# Patient Record
Sex: MALE | Race: WHITE | NOT HISPANIC OR LATINO | Employment: OTHER | URBAN - METROPOLITAN AREA
[De-identification: names, ages, dates, MRNs, and addresses within clinical notes are randomized per-mention and may not be internally consistent; named-entity substitution may affect disease eponyms.]

---

## 2017-01-01 ENCOUNTER — TELEPHONE (OUTPATIENT)
Dept: CARDIOLOGY | Facility: CLINIC | Age: 82
End: 2017-01-01

## 2017-01-01 ENCOUNTER — ANESTHESIA (OUTPATIENT)
Dept: PERIOP | Facility: HOSPITAL | Age: 82
End: 2017-01-01

## 2017-01-01 ENCOUNTER — OFFICE VISIT (OUTPATIENT)
Dept: SURGERY | Facility: CLINIC | Age: 82
End: 2017-01-01

## 2017-01-01 ENCOUNTER — APPOINTMENT (OUTPATIENT)
Dept: CT IMAGING | Facility: HOSPITAL | Age: 82
End: 2017-01-01

## 2017-01-01 ENCOUNTER — ANESTHESIA EVENT (OUTPATIENT)
Dept: PERIOP | Facility: HOSPITAL | Age: 82
End: 2017-01-01

## 2017-01-01 ENCOUNTER — HOSPITAL ENCOUNTER (INPATIENT)
Facility: HOSPITAL | Age: 82
LOS: 3 days | End: 2017-03-13
Attending: EMERGENCY MEDICINE | Admitting: INTERNAL MEDICINE

## 2017-01-01 ENCOUNTER — TELEPHONE (OUTPATIENT)
Dept: ORTHOPEDIC SURGERY | Facility: CLINIC | Age: 82
End: 2017-01-01

## 2017-01-01 ENCOUNTER — APPOINTMENT (OUTPATIENT)
Dept: GENERAL RADIOLOGY | Facility: HOSPITAL | Age: 82
End: 2017-01-01

## 2017-01-01 VITALS
SYSTOLIC BLOOD PRESSURE: 122 MMHG | HEART RATE: 61 BPM | BODY MASS INDEX: 28.43 KG/M2 | RESPIRATION RATE: 20 BRPM | WEIGHT: 198.6 LBS | TEMPERATURE: 97.8 F | HEIGHT: 70 IN | DIASTOLIC BLOOD PRESSURE: 78 MMHG | OXYGEN SATURATION: 97 %

## 2017-01-01 VITALS
BODY MASS INDEX: 27.84 KG/M2 | WEIGHT: 194.44 LBS | HEIGHT: 70 IN | DIASTOLIC BLOOD PRESSURE: 64 MMHG | SYSTOLIC BLOOD PRESSURE: 110 MMHG | OXYGEN SATURATION: 91 % | TEMPERATURE: 102.7 F

## 2017-01-01 DIAGNOSIS — G30.8 ALZHEIMER'S DISEASE OF OTHER ONSET WITHOUT BEHAVIORAL DISTURBANCE: ICD-10-CM

## 2017-01-01 DIAGNOSIS — R10.9 ABDOMINAL PAIN, UNSPECIFIED LOCATION: Primary | ICD-10-CM

## 2017-01-01 DIAGNOSIS — K57.92 DIVERTICULITIS OF INTESTINE WITHOUT PERFORATION OR ABSCESS WITHOUT BLEEDING, UNSPECIFIED PART OF INTESTINAL TRACT: ICD-10-CM

## 2017-01-01 DIAGNOSIS — K64.4 EXTERNAL HEMORRHOIDS WITHOUT COMPLICATION: ICD-10-CM

## 2017-01-01 DIAGNOSIS — K64.8 INTERNAL HEMORRHOIDS WITHOUT COMPLICATION: Primary | ICD-10-CM

## 2017-01-01 DIAGNOSIS — I10 ESSENTIAL HYPERTENSION: ICD-10-CM

## 2017-01-01 DIAGNOSIS — F02.80 ALZHEIMER'S DISEASE OF OTHER ONSET WITHOUT BEHAVIORAL DISTURBANCE: ICD-10-CM

## 2017-01-01 LAB
ALBUMIN SERPL-MCNC: 4.4 G/DL (ref 3.5–5.2)
ALBUMIN/GLOB SERPL: 1.4 G/DL
ALP SERPL-CCNC: 87 U/L (ref 39–117)
ALT SERPL W P-5'-P-CCNC: 33 U/L (ref 1–41)
ANION GAP SERPL CALCULATED.3IONS-SCNC: 14.9 MMOL/L
AST SERPL-CCNC: 55 U/L (ref 1–40)
BACTERIA SPEC AEROBE CULT: NO GROWTH
BACTERIA UR QL AUTO: ABNORMAL /HPF
BASOPHILS # BLD AUTO: 0.01 10*3/MM3 (ref 0–0.2)
BASOPHILS # BLD AUTO: 0.03 10*3/MM3 (ref 0–0.2)
BASOPHILS NFR BLD AUTO: 0.1 % (ref 0–1.5)
BASOPHILS NFR BLD AUTO: 0.4 % (ref 0–1.5)
BILIRUB SERPL-MCNC: 0.6 MG/DL (ref 0.1–1.2)
BILIRUB UR QL STRIP: NEGATIVE
BUN BLD-MCNC: 18 MG/DL (ref 8–23)
BUN/CREAT SERPL: 15.3 (ref 7–25)
CALCIUM SPEC-SCNC: 10 MG/DL (ref 8.6–10.5)
CHLORIDE SERPL-SCNC: 103 MMOL/L (ref 98–107)
CLARITY UR: ABNORMAL
CO2 SERPL-SCNC: 24.1 MMOL/L (ref 22–29)
COLOR UR: ABNORMAL
CREAT BLD-MCNC: 1.18 MG/DL (ref 0.76–1.27)
D-LACTATE SERPL-SCNC: 1.7 MMOL/L (ref 0.5–2)
D-LACTATE SERPL-SCNC: 4.4 MMOL/L (ref 0.5–2)
DEPRECATED RDW RBC AUTO: 52.4 FL (ref 37–54)
DEPRECATED RDW RBC AUTO: 54.6 FL (ref 37–54)
EOSINOPHIL # BLD AUTO: 0 10*3/MM3 (ref 0–0.7)
EOSINOPHIL # BLD AUTO: 0.15 10*3/MM3 (ref 0–0.7)
EOSINOPHIL NFR BLD AUTO: 0 % (ref 0.3–6.2)
EOSINOPHIL NFR BLD AUTO: 2.2 % (ref 0.3–6.2)
ERYTHROCYTE [DISTWIDTH] IN BLOOD BY AUTOMATED COUNT: 14.8 % (ref 11.5–14.5)
ERYTHROCYTE [DISTWIDTH] IN BLOOD BY AUTOMATED COUNT: 14.9 % (ref 11.5–14.5)
GFR SERPL CREATININE-BSD FRML MDRD: 59 ML/MIN/1.73
GLOBULIN UR ELPH-MCNC: 3.2 GM/DL
GLUCOSE BLD-MCNC: 150 MG/DL (ref 65–99)
GLUCOSE UR STRIP-MCNC: NEGATIVE MG/DL
GRAN CASTS URNS QL MICRO: ABNORMAL /LPF
HCT VFR BLD AUTO: 44 % (ref 40.4–52.2)
HCT VFR BLD AUTO: 47.3 % (ref 40.4–52.2)
HGB BLD-MCNC: 14.2 G/DL (ref 13.7–17.6)
HGB BLD-MCNC: 14.9 G/DL (ref 13.7–17.6)
HGB UR QL STRIP.AUTO: NEGATIVE
HYALINE CASTS UR QL AUTO: ABNORMAL /LPF
IMM GRANULOCYTES # BLD: 0 10*3/MM3 (ref 0–0.03)
IMM GRANULOCYTES # BLD: 0.03 10*3/MM3 (ref 0–0.03)
IMM GRANULOCYTES NFR BLD: 0 % (ref 0–0.5)
IMM GRANULOCYTES NFR BLD: 0.2 % (ref 0–0.5)
KETONES UR QL STRIP: NEGATIVE
LEUKOCYTE ESTERASE UR QL STRIP.AUTO: NEGATIVE
LIPASE SERPL-CCNC: 45 U/L (ref 13–60)
LYMPHOCYTES # BLD AUTO: 0.55 10*3/MM3 (ref 0.9–4.8)
LYMPHOCYTES # BLD AUTO: 1.15 10*3/MM3 (ref 0.9–4.8)
LYMPHOCYTES NFR BLD AUTO: 16.7 % (ref 19.6–45.3)
LYMPHOCYTES NFR BLD AUTO: 4.3 % (ref 19.6–45.3)
MCH RBC QN AUTO: 31.4 PG (ref 27–32.7)
MCH RBC QN AUTO: 31.5 PG (ref 27–32.7)
MCHC RBC AUTO-ENTMCNC: 31.5 G/DL (ref 32.6–36.4)
MCHC RBC AUTO-ENTMCNC: 32.3 G/DL (ref 32.6–36.4)
MCV RBC AUTO: 97.6 FL (ref 79.8–96.2)
MCV RBC AUTO: 99.6 FL (ref 79.8–96.2)
MONOCYTES # BLD AUTO: 0.5 10*3/MM3 (ref 0.2–1.2)
MONOCYTES # BLD AUTO: 0.52 10*3/MM3 (ref 0.2–1.2)
MONOCYTES NFR BLD AUTO: 4.1 % (ref 5–12)
MONOCYTES NFR BLD AUTO: 7.3 % (ref 5–12)
NEUTROPHILS # BLD AUTO: 11.63 10*3/MM3 (ref 1.9–8.1)
NEUTROPHILS # BLD AUTO: 5.06 10*3/MM3 (ref 1.9–8.1)
NEUTROPHILS NFR BLD AUTO: 73.4 % (ref 42.7–76)
NEUTROPHILS NFR BLD AUTO: 91.3 % (ref 42.7–76)
NITRITE UR QL STRIP: NEGATIVE
PH UR STRIP.AUTO: <=5 [PH] (ref 5–8)
PLATELET # BLD AUTO: 207 10*3/MM3 (ref 140–500)
PLATELET # BLD AUTO: 227 10*3/MM3 (ref 140–500)
PMV BLD AUTO: 10.2 FL (ref 6–12)
PMV BLD AUTO: 10.5 FL (ref 6–12)
POTASSIUM BLD-SCNC: 3.9 MMOL/L (ref 3.5–5.2)
PROT SERPL-MCNC: 7.6 G/DL (ref 6–8.5)
PROT UR QL STRIP: ABNORMAL
RBC # BLD AUTO: 4.51 10*6/MM3 (ref 4.6–6)
RBC # BLD AUTO: 4.75 10*6/MM3 (ref 4.6–6)
RBC # UR: ABNORMAL /HPF
REF LAB TEST METHOD: ABNORMAL
SODIUM BLD-SCNC: 142 MMOL/L (ref 136–145)
SP GR UR STRIP: 1.02 (ref 1–1.03)
SQUAMOUS #/AREA URNS HPF: ABNORMAL /HPF
TROPONIN T SERPL-MCNC: <0.01 NG/ML (ref 0–0.03)
UROBILINOGEN UR QL STRIP: ABNORMAL
WBC NRBC COR # BLD: 12.74 10*3/MM3 (ref 4.5–10.7)
WBC NRBC COR # BLD: 6.89 10*3/MM3 (ref 4.5–10.7)
WBC UR QL AUTO: ABNORMAL /HPF

## 2017-01-01 PROCEDURE — 81001 URINALYSIS AUTO W/SCOPE: CPT | Performed by: EMERGENCY MEDICINE

## 2017-01-01 PROCEDURE — 0WJG0ZZ INSPECTION OF PERITONEAL CAVITY, OPEN APPROACH: ICD-10-PCS | Performed by: SURGERY

## 2017-01-01 PROCEDURE — 49000 EXPLORATION OF ABDOMEN: CPT | Performed by: SURGERY

## 2017-01-01 PROCEDURE — 99204 OFFICE O/P NEW MOD 45 MIN: CPT | Performed by: COLON & RECTAL SURGERY

## 2017-01-01 PROCEDURE — 25010000002 NEOSTIGMINE 10 MG/10ML SOLUTION: Performed by: ANESTHESIOLOGY

## 2017-01-01 PROCEDURE — 99223 1ST HOSP IP/OBS HIGH 75: CPT | Performed by: SURGERY

## 2017-01-01 PROCEDURE — 74176 CT ABD & PELVIS W/O CONTRAST: CPT

## 2017-01-01 PROCEDURE — 87086 URINE CULTURE/COLONY COUNT: CPT | Performed by: EMERGENCY MEDICINE

## 2017-01-01 PROCEDURE — 25010000002 HYDROMORPHONE PER 4 MG

## 2017-01-01 PROCEDURE — 94799 UNLISTED PULMONARY SVC/PX: CPT

## 2017-01-01 PROCEDURE — 25010000002 LORAZEPAM PER 2 MG: Performed by: INTERNAL MEDICINE

## 2017-01-01 PROCEDURE — 25010000002 HYDRALAZINE PER 20 MG: Performed by: EMERGENCY MEDICINE

## 2017-01-01 PROCEDURE — 25010000002 PIPERACILLIN SOD-TAZOBACTAM PER 1 G: Performed by: SURGERY

## 2017-01-01 PROCEDURE — 25010000002 MORPHINE PER 10 MG: Performed by: EMERGENCY MEDICINE

## 2017-01-01 PROCEDURE — 25010000002 ONDANSETRON PER 1 MG: Performed by: EMERGENCY MEDICINE

## 2017-01-01 PROCEDURE — 25010000002 LORAZEPAM PER 2 MG: Performed by: SURGERY

## 2017-01-01 PROCEDURE — 93005 ELECTROCARDIOGRAM TRACING: CPT | Performed by: INTERNAL MEDICINE

## 2017-01-01 PROCEDURE — 94002 VENT MGMT INPAT INIT DAY: CPT

## 2017-01-01 PROCEDURE — 25010000002 FENTANYL CITRATE (PF) 100 MCG/2ML SOLUTION: Performed by: ANESTHESIOLOGY

## 2017-01-01 PROCEDURE — 85025 COMPLETE CBC W/AUTO DIFF WBC: CPT | Performed by: EMERGENCY MEDICINE

## 2017-01-01 PROCEDURE — 46600 DIAGNOSTIC ANOSCOPY SPX: CPT | Performed by: COLON & RECTAL SURGERY

## 2017-01-01 PROCEDURE — 0WJP4ZZ INSPECTION OF GASTROINTESTINAL TRACT, PERCUTANEOUS ENDOSCOPIC APPROACH: ICD-10-PCS | Performed by: SURGERY

## 2017-01-01 PROCEDURE — 93005 ELECTROCARDIOGRAM TRACING: CPT | Performed by: PHYSICIAN ASSISTANT

## 2017-01-01 PROCEDURE — 25010000002 DEXAMETHASONE PER 1 MG: Performed by: ANESTHESIOLOGY

## 2017-01-01 PROCEDURE — 93010 ELECTROCARDIOGRAM REPORT: CPT | Performed by: INTERNAL MEDICINE

## 2017-01-01 PROCEDURE — 25010000002 HYDROMORPHONE PER 4 MG: Performed by: INTERNAL MEDICINE

## 2017-01-01 PROCEDURE — 25010000002 HYDROMORPHONE PER 4 MG: Performed by: EMERGENCY MEDICINE

## 2017-01-01 PROCEDURE — 25010000002 SUCCINYLCHOLINE PER 20 MG: Performed by: ANESTHESIOLOGY

## 2017-01-01 PROCEDURE — 99285 EMERGENCY DEPT VISIT HI MDM: CPT

## 2017-01-01 PROCEDURE — 25010000002 MORPHINE PER 10 MG: Performed by: INTERNAL MEDICINE

## 2017-01-01 PROCEDURE — 83690 ASSAY OF LIPASE: CPT | Performed by: EMERGENCY MEDICINE

## 2017-01-01 PROCEDURE — 25010000002 ONDANSETRON PER 1 MG: Performed by: ANESTHESIOLOGY

## 2017-01-01 PROCEDURE — 71010 HC CHEST PA OR AP: CPT

## 2017-01-01 PROCEDURE — 25010000002 HYDROMORPHONE PER 4 MG: Performed by: ANESTHESIOLOGY

## 2017-01-01 PROCEDURE — 0WJP0ZZ INSPECTION OF GASTROINTESTINAL TRACT, OPEN APPROACH: ICD-10-PCS | Performed by: SURGERY

## 2017-01-01 PROCEDURE — 83605 ASSAY OF LACTIC ACID: CPT | Performed by: INTERNAL MEDICINE

## 2017-01-01 PROCEDURE — 83605 ASSAY OF LACTIC ACID: CPT | Performed by: EMERGENCY MEDICINE

## 2017-01-01 PROCEDURE — 84484 ASSAY OF TROPONIN QUANT: CPT | Performed by: PHYSICIAN ASSISTANT

## 2017-01-01 PROCEDURE — 0DJW4ZZ INSPECTION OF PERITONEUM, PERCUTANEOUS ENDOSCOPIC APPROACH: ICD-10-PCS | Performed by: SURGERY

## 2017-01-01 PROCEDURE — 25010000002 LEVOFLOXACIN PER 250 MG: Performed by: INTERNAL MEDICINE

## 2017-01-01 PROCEDURE — 80053 COMPREHEN METABOLIC PANEL: CPT | Performed by: EMERGENCY MEDICINE

## 2017-01-01 PROCEDURE — 25010000002 MORPHINE PER 10 MG: Performed by: PHYSICIAN ASSISTANT

## 2017-01-01 PROCEDURE — 25010000002 ONDANSETRON PER 1 MG: Performed by: INTERNAL MEDICINE

## 2017-01-01 PROCEDURE — 99221 1ST HOSP IP/OBS SF/LOW 40: CPT | Performed by: INTERNAL MEDICINE

## 2017-01-01 PROCEDURE — 25010000002 PHENYLEPHRINE PER 1 ML: Performed by: ANESTHESIOLOGY

## 2017-01-01 PROCEDURE — 25010000002 LORAZEPAM PER 2 MG: Performed by: EMERGENCY MEDICINE

## 2017-01-01 PROCEDURE — 85025 COMPLETE CBC W/AUTO DIFF WBC: CPT | Performed by: INTERNAL MEDICINE

## 2017-01-01 PROCEDURE — 25010000002 PROPOFOL 10 MG/ML EMULSION: Performed by: ANESTHESIOLOGY

## 2017-01-01 RX ORDER — MORPHINE SULFATE 2 MG/ML
2 INJECTION, SOLUTION INTRAMUSCULAR; INTRAVENOUS ONCE
Status: COMPLETED | OUTPATIENT
Start: 2017-01-01 | End: 2017-01-01

## 2017-01-01 RX ORDER — LORAZEPAM 2 MG/ML
1 INJECTION INTRAMUSCULAR
Status: DISCONTINUED | OUTPATIENT
Start: 2017-01-01 | End: 2017-01-01 | Stop reason: HOSPADM

## 2017-01-01 RX ORDER — LABETALOL HYDROCHLORIDE 5 MG/ML
10 INJECTION, SOLUTION INTRAVENOUS ONCE
Status: COMPLETED | OUTPATIENT
Start: 2017-01-01 | End: 2017-01-01

## 2017-01-01 RX ORDER — HYDROMORPHONE HCL 110MG/55ML
2 PATIENT CONTROLLED ANALGESIA SYRINGE INTRAVENOUS
Status: DISCONTINUED | OUTPATIENT
Start: 2017-01-01 | End: 2017-01-01 | Stop reason: HOSPADM

## 2017-01-01 RX ORDER — ACETAMINOPHEN 325 MG/1
650 TABLET ORAL EVERY 4 HOURS PRN
Status: DISCONTINUED | OUTPATIENT
Start: 2017-01-01 | End: 2017-01-01 | Stop reason: HOSPADM

## 2017-01-01 RX ORDER — PANTOPRAZOLE SODIUM 40 MG/10ML
40 INJECTION, POWDER, LYOPHILIZED, FOR SOLUTION INTRAVENOUS
Status: DISCONTINUED | OUTPATIENT
Start: 2017-01-01 | End: 2017-01-01 | Stop reason: HOSPADM

## 2017-01-01 RX ORDER — PROMETHAZINE HYDROCHLORIDE 25 MG/1
12.5 TABLET ORAL ONCE AS NEEDED
Status: DISCONTINUED | OUTPATIENT
Start: 2017-01-01 | End: 2017-01-01 | Stop reason: HOSPADM

## 2017-01-01 RX ORDER — TAMSULOSIN HYDROCHLORIDE 0.4 MG/1
0.4 CAPSULE ORAL DAILY
Status: DISCONTINUED | OUTPATIENT
Start: 2017-01-01 | End: 2017-01-01

## 2017-01-01 RX ORDER — FENTANYL CITRATE 50 UG/ML
INJECTION, SOLUTION INTRAMUSCULAR; INTRAVENOUS AS NEEDED
Status: DISCONTINUED | OUTPATIENT
Start: 2017-01-01 | End: 2017-01-01 | Stop reason: SURG

## 2017-01-01 RX ORDER — IBUPROFEN 200 MG
625 CAPSULE ORAL 2 TIMES DAILY WITH MEALS
Status: DISCONTINUED | OUTPATIENT
Start: 2017-01-01 | End: 2017-01-01

## 2017-01-01 RX ORDER — HYDROMORPHONE HYDROCHLORIDE 1 MG/ML
0.25 INJECTION, SOLUTION INTRAMUSCULAR; INTRAVENOUS; SUBCUTANEOUS
Status: DISCONTINUED | OUTPATIENT
Start: 2017-01-01 | End: 2017-01-01 | Stop reason: HOSPADM

## 2017-01-01 RX ORDER — LORAZEPAM 2 MG/ML
2 CONCENTRATE ORAL
Status: DISCONTINUED | OUTPATIENT
Start: 2017-01-01 | End: 2017-01-01 | Stop reason: HOSPADM

## 2017-01-01 RX ORDER — SODIUM CHLORIDE 0.9 % (FLUSH) 0.9 %
1-10 SYRINGE (ML) INJECTION AS NEEDED
Status: DISCONTINUED | OUTPATIENT
Start: 2017-01-01 | End: 2017-01-01 | Stop reason: HOSPADM

## 2017-01-01 RX ORDER — SCOLOPAMINE TRANSDERMAL SYSTEM 1 MG/1
1 PATCH, EXTENDED RELEASE TRANSDERMAL
Status: DISCONTINUED | OUTPATIENT
Start: 2017-01-01 | End: 2017-01-01 | Stop reason: HOSPADM

## 2017-01-01 RX ORDER — SODIUM CHLORIDE 0.9 % (FLUSH) 0.9 %
10 SYRINGE (ML) INJECTION AS NEEDED
Status: DISCONTINUED | OUTPATIENT
Start: 2017-01-01 | End: 2017-01-01

## 2017-01-01 RX ORDER — LABETALOL HYDROCHLORIDE 5 MG/ML
5 INJECTION, SOLUTION INTRAVENOUS
Status: DISCONTINUED | OUTPATIENT
Start: 2017-01-01 | End: 2017-01-01 | Stop reason: HOSPADM

## 2017-01-01 RX ORDER — LORAZEPAM 2 MG/ML
0.5 CONCENTRATE ORAL
Status: DISCONTINUED | OUTPATIENT
Start: 2017-01-01 | End: 2017-01-01 | Stop reason: HOSPADM

## 2017-01-01 RX ORDER — DIPHENHYDRAMINE HYDROCHLORIDE 50 MG/ML
6.25 INJECTION INTRAMUSCULAR; INTRAVENOUS
Status: DISCONTINUED | OUTPATIENT
Start: 2017-01-01 | End: 2017-01-01 | Stop reason: HOSPADM

## 2017-01-01 RX ORDER — NEOSTIGMINE METHYLSULFATE 1 MG/ML
INJECTION, SOLUTION INTRAVENOUS AS NEEDED
Status: DISCONTINUED | OUTPATIENT
Start: 2017-01-01 | End: 2017-01-01 | Stop reason: SURG

## 2017-01-01 RX ORDER — LORAZEPAM 2 MG/ML
0.5 INJECTION INTRAMUSCULAR ONCE
Status: COMPLETED | OUTPATIENT
Start: 2017-01-01 | End: 2017-01-01

## 2017-01-01 RX ORDER — ROCURONIUM BROMIDE 10 MG/ML
INJECTION, SOLUTION INTRAVENOUS AS NEEDED
Status: DISCONTINUED | OUTPATIENT
Start: 2017-01-01 | End: 2017-01-01 | Stop reason: SURG

## 2017-01-01 RX ORDER — ONDANSETRON 2 MG/ML
4 INJECTION INTRAMUSCULAR; INTRAVENOUS ONCE AS NEEDED
Status: DISCONTINUED | OUTPATIENT
Start: 2017-01-01 | End: 2017-01-01 | Stop reason: HOSPADM

## 2017-01-01 RX ORDER — METOPROLOL SUCCINATE 25 MG/1
25 TABLET, EXTENDED RELEASE ORAL DAILY
COMMUNITY

## 2017-01-01 RX ORDER — ONDANSETRON 2 MG/ML
INJECTION INTRAMUSCULAR; INTRAVENOUS AS NEEDED
Status: DISCONTINUED | OUTPATIENT
Start: 2017-01-01 | End: 2017-01-01 | Stop reason: SURG

## 2017-01-01 RX ORDER — PROPOFOL 10 MG/ML
VIAL (ML) INTRAVENOUS AS NEEDED
Status: DISCONTINUED | OUTPATIENT
Start: 2017-01-01 | End: 2017-01-01 | Stop reason: SURG

## 2017-01-01 RX ORDER — FUROSEMIDE 20 MG/1
20 TABLET ORAL DAILY
Status: DISCONTINUED | OUTPATIENT
Start: 2017-01-01 | End: 2017-01-01

## 2017-01-01 RX ORDER — ACETAMINOPHEN 160 MG/5ML
650 SOLUTION ORAL EVERY 4 HOURS PRN
Status: DISCONTINUED | OUTPATIENT
Start: 2017-01-01 | End: 2017-01-01 | Stop reason: HOSPADM

## 2017-01-01 RX ORDER — LORAZEPAM 1 MG/1
1 TABLET ORAL
Status: DISCONTINUED | OUTPATIENT
Start: 2017-01-01 | End: 2017-01-01 | Stop reason: HOSPADM

## 2017-01-01 RX ORDER — DIPHENOXYLATE HYDROCHLORIDE AND ATROPINE SULFATE 2.5; .025 MG/1; MG/1
1 TABLET ORAL
Status: DISCONTINUED | OUTPATIENT
Start: 2017-01-01 | End: 2017-01-01 | Stop reason: HOSPADM

## 2017-01-01 RX ORDER — LORAZEPAM 2 MG/ML
1 INJECTION INTRAMUSCULAR EVERY 4 HOURS PRN
Status: DISCONTINUED | OUTPATIENT
Start: 2017-01-01 | End: 2017-01-01

## 2017-01-01 RX ORDER — NALOXONE HCL 0.4 MG/ML
0.4 VIAL (ML) INJECTION AS NEEDED
Status: DISCONTINUED | OUTPATIENT
Start: 2017-01-01 | End: 2017-01-01 | Stop reason: HOSPADM

## 2017-01-01 RX ORDER — LORAZEPAM 2 MG/ML
1 CONCENTRATE ORAL
Status: DISCONTINUED | OUTPATIENT
Start: 2017-01-01 | End: 2017-01-01 | Stop reason: HOSPADM

## 2017-01-01 RX ORDER — SODIUM CHLORIDE 9 MG/ML
INJECTION, SOLUTION INTRAVENOUS AS NEEDED
Status: DISCONTINUED | OUTPATIENT
Start: 2017-01-01 | End: 2017-01-01 | Stop reason: HOSPADM

## 2017-01-01 RX ORDER — GLYCOPYRROLATE 0.2 MG/ML
INJECTION INTRAMUSCULAR; INTRAVENOUS AS NEEDED
Status: DISCONTINUED | OUTPATIENT
Start: 2017-01-01 | End: 2017-01-01 | Stop reason: SURG

## 2017-01-01 RX ORDER — HYDROMORPHONE HYDROCHLORIDE 1 MG/ML
0.5 INJECTION, SOLUTION INTRAMUSCULAR; INTRAVENOUS; SUBCUTANEOUS
Status: DISCONTINUED | OUTPATIENT
Start: 2017-01-01 | End: 2017-01-01 | Stop reason: HOSPADM

## 2017-01-01 RX ORDER — DEXAMETHASONE SODIUM PHOSPHATE 10 MG/ML
INJECTION INTRAMUSCULAR; INTRAVENOUS AS NEEDED
Status: DISCONTINUED | OUTPATIENT
Start: 2017-01-01 | End: 2017-01-01 | Stop reason: SURG

## 2017-01-01 RX ORDER — QUETIAPINE FUMARATE 50 MG/1
50 TABLET, FILM COATED ORAL NIGHTLY
Status: DISCONTINUED | OUTPATIENT
Start: 2017-01-01 | End: 2017-01-01

## 2017-01-01 RX ORDER — FENTANYL CITRATE 50 UG/ML
50 INJECTION, SOLUTION INTRAMUSCULAR; INTRAVENOUS
Status: DISCONTINUED | OUTPATIENT
Start: 2017-01-01 | End: 2017-01-01 | Stop reason: HOSPADM

## 2017-01-01 RX ORDER — MAGNESIUM HYDROXIDE 1200 MG/15ML
LIQUID ORAL AS NEEDED
Status: DISCONTINUED | OUTPATIENT
Start: 2017-01-01 | End: 2017-01-01 | Stop reason: HOSPADM

## 2017-01-01 RX ORDER — LIDOCAINE HYDROCHLORIDE 20 MG/ML
INJECTION, SOLUTION INFILTRATION; PERINEURAL AS NEEDED
Status: DISCONTINUED | OUTPATIENT
Start: 2017-01-01 | End: 2017-01-01 | Stop reason: SURG

## 2017-01-01 RX ORDER — HYDRALAZINE HYDROCHLORIDE 20 MG/ML
5 INJECTION INTRAMUSCULAR; INTRAVENOUS
Status: DISCONTINUED | OUTPATIENT
Start: 2017-01-01 | End: 2017-01-01 | Stop reason: HOSPADM

## 2017-01-01 RX ORDER — ONDANSETRON 2 MG/ML
4 INJECTION INTRAMUSCULAR; INTRAVENOUS ONCE
Status: COMPLETED | OUTPATIENT
Start: 2017-01-01 | End: 2017-01-01

## 2017-01-01 RX ORDER — PROMETHAZINE HYDROCHLORIDE 25 MG/1
25 SUPPOSITORY RECTAL ONCE AS NEEDED
Status: DISCONTINUED | OUTPATIENT
Start: 2017-01-01 | End: 2017-01-01 | Stop reason: HOSPADM

## 2017-01-01 RX ORDER — LORAZEPAM 2 MG/ML
0.5 INJECTION INTRAMUSCULAR
Status: DISCONTINUED | OUTPATIENT
Start: 2017-01-01 | End: 2017-01-01 | Stop reason: HOSPADM

## 2017-01-01 RX ORDER — HYDROMORPHONE HCL 110MG/55ML
1.5 PATIENT CONTROLLED ANALGESIA SYRINGE INTRAVENOUS
Status: DISCONTINUED | OUTPATIENT
Start: 2017-01-01 | End: 2017-01-01 | Stop reason: HOSPADM

## 2017-01-01 RX ORDER — FAMOTIDINE 10 MG/ML
20 INJECTION, SOLUTION INTRAVENOUS ONCE
Status: COMPLETED | OUTPATIENT
Start: 2017-01-01 | End: 2017-01-01

## 2017-01-01 RX ORDER — LIDOCAINE HYDROCHLORIDE 10 MG/ML
0.5 INJECTION, SOLUTION EPIDURAL; INFILTRATION; INTRACAUDAL; PERINEURAL ONCE AS NEEDED
Status: DISCONTINUED | OUTPATIENT
Start: 2017-01-01 | End: 2017-01-01 | Stop reason: HOSPADM

## 2017-01-01 RX ORDER — PROMETHAZINE HYDROCHLORIDE 25 MG/ML
6.25 INJECTION, SOLUTION INTRAMUSCULAR; INTRAVENOUS ONCE AS NEEDED
Status: DISCONTINUED | OUTPATIENT
Start: 2017-01-01 | End: 2017-01-01 | Stop reason: HOSPADM

## 2017-01-01 RX ORDER — LEVOTHYROXINE SODIUM ANHYDROUS 100 UG/5ML
125 INJECTION, POWDER, LYOPHILIZED, FOR SOLUTION INTRAVENOUS
Status: DISCONTINUED | OUTPATIENT
Start: 2017-01-01 | End: 2017-01-01 | Stop reason: HOSPADM

## 2017-01-01 RX ORDER — ACETAMINOPHEN 650 MG/1
650 SUPPOSITORY RECTAL EVERY 4 HOURS PRN
Status: DISCONTINUED | OUTPATIENT
Start: 2017-01-01 | End: 2017-01-01 | Stop reason: HOSPADM

## 2017-01-01 RX ORDER — MODAFINIL 100 MG/1
200 TABLET ORAL DAILY
Status: DISCONTINUED | OUTPATIENT
Start: 2017-01-01 | End: 2017-01-01

## 2017-01-01 RX ORDER — LORAZEPAM 2 MG/ML
1 INJECTION INTRAMUSCULAR ONCE
Status: COMPLETED | OUTPATIENT
Start: 2017-01-01 | End: 2017-01-01

## 2017-01-01 RX ORDER — GLYCOPYRROLATE 0.2 MG/ML
0.2 INJECTION INTRAMUSCULAR; INTRAVENOUS
Status: DISCONTINUED | OUTPATIENT
Start: 2017-01-01 | End: 2017-01-01 | Stop reason: HOSPADM

## 2017-01-01 RX ORDER — NALOXONE HCL 0.4 MG/ML
0.4 VIAL (ML) INJECTION
Status: DISCONTINUED | OUTPATIENT
Start: 2017-01-01 | End: 2017-01-01 | Stop reason: HOSPADM

## 2017-01-01 RX ORDER — HYDROMORPHONE HYDROCHLORIDE 1 MG/ML
0.5 INJECTION, SOLUTION INTRAMUSCULAR; INTRAVENOUS; SUBCUTANEOUS ONCE
Status: COMPLETED | OUTPATIENT
Start: 2017-01-01 | End: 2017-01-01

## 2017-01-01 RX ORDER — LORAZEPAM 1 MG/1
0.5 TABLET ORAL EVERY 6 HOURS PRN
Status: DISCONTINUED | OUTPATIENT
Start: 2017-01-01 | End: 2017-01-01

## 2017-01-01 RX ORDER — LORAZEPAM 0.5 MG/1
0.5 TABLET ORAL
Status: DISCONTINUED | OUTPATIENT
Start: 2017-01-01 | End: 2017-01-01 | Stop reason: HOSPADM

## 2017-01-01 RX ORDER — BUPIVACAINE HYDROCHLORIDE AND EPINEPHRINE 5; 5 MG/ML; UG/ML
INJECTION, SOLUTION PERINEURAL AS NEEDED
Status: DISCONTINUED | OUTPATIENT
Start: 2017-01-01 | End: 2017-01-01 | Stop reason: HOSPADM

## 2017-01-01 RX ORDER — GLYCOPYRROLATE 0.2 MG/ML
0.4 INJECTION INTRAMUSCULAR; INTRAVENOUS
Status: DISCONTINUED | OUTPATIENT
Start: 2017-01-01 | End: 2017-01-01 | Stop reason: HOSPADM

## 2017-01-01 RX ORDER — PRAMIPEXOLE DIHYDROCHLORIDE 0.25 MG/1
0.12 TABLET ORAL ONCE
Status: DISCONTINUED | OUTPATIENT
Start: 2017-01-01 | End: 2017-01-01

## 2017-01-01 RX ORDER — PROMETHAZINE HYDROCHLORIDE 25 MG/1
25 TABLET ORAL ONCE AS NEEDED
Status: DISCONTINUED | OUTPATIENT
Start: 2017-01-01 | End: 2017-01-01 | Stop reason: HOSPADM

## 2017-01-01 RX ORDER — DEXTROSE, SODIUM CHLORIDE, AND POTASSIUM CHLORIDE 5; .45; .15 G/100ML; G/100ML; G/100ML
100 INJECTION INTRAVENOUS CONTINUOUS
Status: DISCONTINUED | OUTPATIENT
Start: 2017-01-01 | End: 2017-01-01 | Stop reason: HOSPADM

## 2017-01-01 RX ORDER — LORAZEPAM 2 MG/ML
2 INJECTION INTRAMUSCULAR
Status: DISCONTINUED | OUTPATIENT
Start: 2017-01-01 | End: 2017-01-01 | Stop reason: HOSPADM

## 2017-01-01 RX ORDER — LEVOTHYROXINE SODIUM 137 UG/1
137 TABLET ORAL DAILY
Status: DISCONTINUED | OUTPATIENT
Start: 2017-01-01 | End: 2017-01-01

## 2017-01-01 RX ORDER — FLUMAZENIL 0.1 MG/ML
0.2 INJECTION INTRAVENOUS AS NEEDED
Status: DISCONTINUED | OUTPATIENT
Start: 2017-01-01 | End: 2017-01-01 | Stop reason: HOSPADM

## 2017-01-01 RX ORDER — DIPHENOXYLATE HYDROCHLORIDE AND ATROPINE SULFATE 2.5; .025 MG/1; MG/1
1 TABLET ORAL DAILY
Status: DISCONTINUED | OUTPATIENT
Start: 2017-01-01 | End: 2017-01-01

## 2017-01-01 RX ORDER — PANTOPRAZOLE SODIUM 40 MG/1
40 TABLET, DELAYED RELEASE ORAL
Status: DISCONTINUED | OUTPATIENT
Start: 2017-01-01 | End: 2017-01-01

## 2017-01-01 RX ORDER — LEVOFLOXACIN 5 MG/ML
500 INJECTION, SOLUTION INTRAVENOUS EVERY 24 HOURS
Status: DISCONTINUED | OUTPATIENT
Start: 2017-01-01 | End: 2017-01-01

## 2017-01-01 RX ORDER — CEFDINIR 300 MG/1
CAPSULE ORAL
Refills: 0 | COMMUNITY
Start: 2016-01-01

## 2017-01-01 RX ORDER — HYDRALAZINE HYDROCHLORIDE 20 MG/ML
20 INJECTION INTRAMUSCULAR; INTRAVENOUS ONCE
Status: COMPLETED | OUTPATIENT
Start: 2017-01-01 | End: 2017-01-01

## 2017-01-01 RX ORDER — DEXTROSE AND SODIUM CHLORIDE 5; .45 G/100ML; G/100ML
100 INJECTION, SOLUTION INTRAVENOUS CONTINUOUS
Status: DISCONTINUED | OUTPATIENT
Start: 2017-01-01 | End: 2017-01-01 | Stop reason: SDUPTHER

## 2017-01-01 RX ORDER — SODIUM CHLORIDE, SODIUM LACTATE, POTASSIUM CHLORIDE, CALCIUM CHLORIDE 600; 310; 30; 20 MG/100ML; MG/100ML; MG/100ML; MG/100ML
9 INJECTION, SOLUTION INTRAVENOUS CONTINUOUS PRN
Status: DISCONTINUED | OUTPATIENT
Start: 2017-01-01 | End: 2017-01-01 | Stop reason: HOSPADM

## 2017-01-01 RX ORDER — SUCCINYLCHOLINE CHLORIDE 20 MG/ML
INJECTION INTRAMUSCULAR; INTRAVENOUS AS NEEDED
Status: DISCONTINUED | OUTPATIENT
Start: 2017-01-01 | End: 2017-01-01 | Stop reason: SURG

## 2017-01-01 RX ORDER — MORPHINE SULFATE 2 MG/ML
1 INJECTION, SOLUTION INTRAMUSCULAR; INTRAVENOUS EVERY 4 HOURS PRN
Status: DISCONTINUED | OUTPATIENT
Start: 2017-01-01 | End: 2017-01-01 | Stop reason: HOSPADM

## 2017-01-01 RX ORDER — VALSARTAN 320 MG/1
320 TABLET ORAL
Status: DISCONTINUED | OUTPATIENT
Start: 2017-01-01 | End: 2017-01-01

## 2017-01-01 RX ORDER — OXYCODONE HYDROCHLORIDE AND ACETAMINOPHEN 5; 325 MG/1; MG/1
1 TABLET ORAL ONCE AS NEEDED
Status: DISCONTINUED | OUTPATIENT
Start: 2017-01-01 | End: 2017-01-01 | Stop reason: HOSPADM

## 2017-01-01 RX ORDER — METOPROLOL SUCCINATE 25 MG/1
25 TABLET, EXTENDED RELEASE ORAL DAILY
Status: DISCONTINUED | OUTPATIENT
Start: 2017-01-01 | End: 2017-01-01

## 2017-01-01 RX ORDER — ONDANSETRON 2 MG/ML
4 INJECTION INTRAMUSCULAR; INTRAVENOUS EVERY 6 HOURS PRN
Status: DISCONTINUED | OUTPATIENT
Start: 2017-01-01 | End: 2017-01-01 | Stop reason: HOSPADM

## 2017-01-01 RX ORDER — LEVOFLOXACIN 5 MG/ML
500 INJECTION, SOLUTION INTRAVENOUS ONCE
Status: COMPLETED | OUTPATIENT
Start: 2017-01-01 | End: 2017-01-01

## 2017-01-01 RX ORDER — ASPIRIN 81 MG/1
81 TABLET ORAL DAILY
Status: DISCONTINUED | OUTPATIENT
Start: 2017-01-01 | End: 2017-01-01

## 2017-01-01 RX ORDER — LORAZEPAM 1 MG/1
2 TABLET ORAL
Status: DISCONTINUED | OUTPATIENT
Start: 2017-01-01 | End: 2017-01-01 | Stop reason: HOSPADM

## 2017-01-01 RX ADMIN — HYDROMORPHONE HYDROCHLORIDE 2 MG: 2 INJECTION, SOLUTION INTRAMUSCULAR; INTRAVENOUS; SUBCUTANEOUS at 12:17

## 2017-01-01 RX ADMIN — LIDOCAINE HYDROCHLORIDE 80 MG: 20 INJECTION, SOLUTION INFILTRATION; PERINEURAL at 16:17

## 2017-01-01 RX ADMIN — LORAZEPAM 1 MG: 2 INJECTION, SOLUTION INTRAMUSCULAR; INTRAVENOUS at 10:58

## 2017-01-01 RX ADMIN — HYDROMORPHONE HYDROCHLORIDE 1 MG: 1 INJECTION, SOLUTION INTRAMUSCULAR; INTRAVENOUS; SUBCUTANEOUS at 13:11

## 2017-01-01 RX ADMIN — HYDROMORPHONE HYDROCHLORIDE 2 MG: 2 INJECTION, SOLUTION INTRAMUSCULAR; INTRAVENOUS; SUBCUTANEOUS at 10:02

## 2017-01-01 RX ADMIN — PHENYLEPHRINE HYDROCHLORIDE 100 MCG: 10 INJECTION INTRAVENOUS at 16:17

## 2017-01-01 RX ADMIN — GLYCOPYRROLATE 0.2 MG: 0.2 INJECTION, SOLUTION INTRAMUSCULAR; INTRAVENOUS at 20:19

## 2017-01-01 RX ADMIN — MORPHINE SULFATE 2 MG: 2 INJECTION, SOLUTION INTRAMUSCULAR; INTRAVENOUS at 02:30

## 2017-01-01 RX ADMIN — SODIUM CHLORIDE, POTASSIUM CHLORIDE, SODIUM LACTATE AND CALCIUM CHLORIDE 9 ML/HR: 600; 310; 30; 20 INJECTION, SOLUTION INTRAVENOUS at 16:05

## 2017-01-01 RX ADMIN — HYDROMORPHONE HYDROCHLORIDE 1 MG: 1 INJECTION, SOLUTION INTRAMUSCULAR; INTRAVENOUS; SUBCUTANEOUS at 22:19

## 2017-01-01 RX ADMIN — NEOSTIGMINE METHYLSULFATE 3 MG: 1 INJECTION INTRAVENOUS at 16:52

## 2017-01-01 RX ADMIN — LORAZEPAM 2 MG: 2 INJECTION INTRAMUSCULAR; INTRAVENOUS at 10:02

## 2017-01-01 RX ADMIN — HYDROMORPHONE HYDROCHLORIDE 2 MG: 2 INJECTION, SOLUTION INTRAMUSCULAR; INTRAVENOUS; SUBCUTANEOUS at 20:32

## 2017-01-01 RX ADMIN — HYDROMORPHONE HYDROCHLORIDE 2 MG: 2 INJECTION, SOLUTION INTRAMUSCULAR; INTRAVENOUS; SUBCUTANEOUS at 01:00

## 2017-01-01 RX ADMIN — LEVOFLOXACIN 500 MG: 500 INJECTION, SOLUTION INTRAVENOUS at 08:12

## 2017-01-01 RX ADMIN — LABETALOL HYDROCHLORIDE 10 MG: 5 INJECTION, SOLUTION INTRAVENOUS at 03:53

## 2017-01-01 RX ADMIN — HYDRALAZINE HYDROCHLORIDE 20 MG: 20 INJECTION INTRAMUSCULAR; INTRAVENOUS at 05:59

## 2017-01-01 RX ADMIN — HYDROMORPHONE HYDROCHLORIDE 2 MG: 2 INJECTION, SOLUTION INTRAMUSCULAR; INTRAVENOUS; SUBCUTANEOUS at 08:44

## 2017-01-01 RX ADMIN — HYDROMORPHONE HYDROCHLORIDE 0.5 MG: 1 INJECTION, SOLUTION INTRAMUSCULAR; INTRAVENOUS; SUBCUTANEOUS at 20:50

## 2017-01-01 RX ADMIN — HYDROMORPHONE HYDROCHLORIDE 2 MG: 2 INJECTION, SOLUTION INTRAMUSCULAR; INTRAVENOUS; SUBCUTANEOUS at 04:42

## 2017-01-01 RX ADMIN — HYDROMORPHONE HYDROCHLORIDE 2 MG: 2 INJECTION, SOLUTION INTRAMUSCULAR; INTRAVENOUS; SUBCUTANEOUS at 03:09

## 2017-01-01 RX ADMIN — FENTANYL CITRATE 50 MCG: 50 INJECTION INTRAMUSCULAR; INTRAVENOUS at 16:27

## 2017-01-01 RX ADMIN — ONDANSETRON 4 MG: 2 INJECTION INTRAMUSCULAR; INTRAVENOUS at 16:52

## 2017-01-01 RX ADMIN — LORAZEPAM 1 MG: 2 INJECTION INTRAMUSCULAR; INTRAVENOUS at 06:42

## 2017-01-01 RX ADMIN — MORPHINE SULFATE 2 MG: 2 INJECTION, SOLUTION INTRAMUSCULAR; INTRAVENOUS at 01:13

## 2017-01-01 RX ADMIN — SUCCINYLCHOLINE CHLORIDE 120 MG: 20 INJECTION, SOLUTION INTRAMUSCULAR; INTRAVENOUS; PARENTERAL at 16:18

## 2017-01-01 RX ADMIN — METOROPROLOL TARTRATE 2.5 MG: 5 INJECTION, SOLUTION INTRAVENOUS at 10:55

## 2017-01-01 RX ADMIN — PROPOFOL 140 MG: 10 INJECTION, EMULSION INTRAVENOUS at 16:17

## 2017-01-01 RX ADMIN — MORPHINE SULFATE 2 MG: 2 INJECTION, SOLUTION INTRAMUSCULAR; INTRAVENOUS at 01:32

## 2017-01-01 RX ADMIN — HYDROMORPHONE HYDROCHLORIDE 1 MG: 1 INJECTION, SOLUTION INTRAMUSCULAR; INTRAVENOUS; SUBCUTANEOUS at 21:49

## 2017-01-01 RX ADMIN — ONDANSETRON 4 MG: 2 INJECTION INTRAMUSCULAR; INTRAVENOUS at 01:11

## 2017-01-01 RX ADMIN — HYDROMORPHONE HYDROCHLORIDE 1 MG: 1 INJECTION, SOLUTION INTRAMUSCULAR; INTRAVENOUS; SUBCUTANEOUS at 16:49

## 2017-01-01 RX ADMIN — DEXAMETHASONE SODIUM PHOSPHATE 8 MG: 10 INJECTION INTRAMUSCULAR; INTRAVENOUS at 16:22

## 2017-01-01 RX ADMIN — PHENYLEPHRINE HYDROCHLORIDE 100 MCG: 10 INJECTION INTRAVENOUS at 16:29

## 2017-01-01 RX ADMIN — HYDROMORPHONE HYDROCHLORIDE 2 MG: 2 INJECTION, SOLUTION INTRAMUSCULAR; INTRAVENOUS; SUBCUTANEOUS at 20:19

## 2017-01-01 RX ADMIN — HYDROMORPHONE HYDROCHLORIDE 2 MG: 2 INJECTION, SOLUTION INTRAMUSCULAR; INTRAVENOUS; SUBCUTANEOUS at 23:52

## 2017-01-01 RX ADMIN — ROCURONIUM BROMIDE 30 MG: 10 INJECTION INTRAVENOUS at 16:29

## 2017-01-01 RX ADMIN — ONDANSETRON 4 MG: 2 INJECTION INTRAMUSCULAR; INTRAVENOUS at 11:25

## 2017-01-01 RX ADMIN — MORPHINE SULFATE 1 MG: 4 INJECTION, SOLUTION INTRAMUSCULAR; INTRAVENOUS at 11:26

## 2017-01-01 RX ADMIN — PHENYLEPHRINE HYDROCHLORIDE 100 MCG: 10 INJECTION INTRAVENOUS at 16:20

## 2017-01-01 RX ADMIN — HYDROMORPHONE HYDROCHLORIDE 2 MG: 2 INJECTION, SOLUTION INTRAMUSCULAR; INTRAVENOUS; SUBCUTANEOUS at 07:29

## 2017-01-01 RX ADMIN — TAZOBACTAM SODIUM AND PIPERACILLIN SODIUM 3.38 G: 375; 3 INJECTION, SOLUTION INTRAVENOUS at 15:29

## 2017-01-01 RX ADMIN — FENTANYL CITRATE 25 MCG: 50 INJECTION INTRAMUSCULAR; INTRAVENOUS at 16:04

## 2017-01-01 RX ADMIN — HYDROMORPHONE HYDROCHLORIDE 2 MG: 2 INJECTION, SOLUTION INTRAMUSCULAR; INTRAVENOUS; SUBCUTANEOUS at 16:10

## 2017-01-01 RX ADMIN — HYDROMORPHONE HYDROCHLORIDE 2 MG: 2 INJECTION, SOLUTION INTRAMUSCULAR; INTRAVENOUS; SUBCUTANEOUS at 00:57

## 2017-01-01 RX ADMIN — GLYCOPYRROLATE 0.4 MG: 0.2 INJECTION, SOLUTION INTRAMUSCULAR; INTRAVENOUS at 12:16

## 2017-01-01 RX ADMIN — LORAZEPAM 2 MG: 2 INJECTION INTRAMUSCULAR; INTRAVENOUS at 07:29

## 2017-01-01 RX ADMIN — FAMOTIDINE 20 MG: 10 INJECTION, SOLUTION INTRAVENOUS at 16:05

## 2017-01-01 RX ADMIN — LORAZEPAM 2 MG: 2 INJECTION INTRAMUSCULAR; INTRAVENOUS at 01:22

## 2017-01-01 RX ADMIN — GLYCOPYRROLATE 0.6 MG: 0.2 INJECTION INTRAMUSCULAR; INTRAVENOUS at 16:52

## 2017-01-01 RX ADMIN — GLYCOPYRROLATE 0.4 MG: 0.2 INJECTION, SOLUTION INTRAMUSCULAR; INTRAVENOUS at 05:05

## 2017-01-01 RX ADMIN — LORAZEPAM 1 MG: 2 INJECTION, SOLUTION INTRAMUSCULAR; INTRAVENOUS at 21:47

## 2017-01-01 RX ADMIN — METRONIDAZOLE 500 MG: 500 INJECTION, SOLUTION INTRAVENOUS at 06:59

## 2017-01-01 RX ADMIN — GLYCOPYRROLATE 0.4 MG: 0.2 INJECTION, SOLUTION INTRAMUSCULAR; INTRAVENOUS at 08:44

## 2017-01-01 RX ADMIN — HYDROMORPHONE HYDROCHLORIDE 2 MG: 2 INJECTION, SOLUTION INTRAMUSCULAR; INTRAVENOUS; SUBCUTANEOUS at 09:33

## 2017-01-01 RX ADMIN — LORAZEPAM 1 MG: 2 INJECTION, SOLUTION INTRAMUSCULAR; INTRAVENOUS at 22:19

## 2017-01-01 RX ADMIN — HYDROMORPHONE HYDROCHLORIDE 2 MG: 2 INJECTION, SOLUTION INTRAMUSCULAR; INTRAVENOUS; SUBCUTANEOUS at 12:19

## 2017-01-01 RX ADMIN — LORAZEPAM 0.5 MG: 2 INJECTION INTRAMUSCULAR; INTRAVENOUS at 05:09

## 2017-01-01 RX ADMIN — POTASSIUM CHLORIDE, DEXTROSE MONOHYDRATE AND SODIUM CHLORIDE 100 ML/HR: 150; 5; 450 INJECTION, SOLUTION INTRAVENOUS at 11:46

## 2017-01-01 RX ADMIN — GLYCOPYRROLATE 0.4 MG: 0.2 INJECTION, SOLUTION INTRAMUSCULAR; INTRAVENOUS at 05:40

## 2017-01-01 RX ADMIN — HYDROMORPHONE HYDROCHLORIDE 2 MG: 2 INJECTION, SOLUTION INTRAMUSCULAR; INTRAVENOUS; SUBCUTANEOUS at 05:04

## 2017-01-01 RX ADMIN — HYDROMORPHONE HYDROCHLORIDE 0.5 MG: 1 INJECTION, SOLUTION INTRAMUSCULAR; INTRAVENOUS; SUBCUTANEOUS at 02:45

## 2017-01-01 RX ADMIN — FENTANYL CITRATE 50 MCG: 50 INJECTION INTRAMUSCULAR; INTRAVENOUS at 16:48

## 2017-01-06 NOTE — PROGRESS NOTES
Jomar Cabral Jr. is a 84 y.o. male who is seen as a consult at the request of Papo Becker MD for Rectal Bleeding.      HPI:  Denies any rb since hospitalization.  After 2-3 glasses of hot water then will stimulate BM. Does not think had cy at admission .  He states it has been years.  Not on blood thinner.  A [employer] service drove him today.  Wife at home.    Pt denies  trouble with hemorrhoids  Denies abs pain or cramping  occas flatus but not painful  Daily BM occas strain.  Pain meds daily.    Fiber - denies  Ss - denies  Denies tissue from anus  Denies anal pain or discomfort sitting or with BM    Past Medical History   Diagnosis Date   • Acquired hypothyroidism 3/8/2016   • Acquired pes planus    • Actinic keratosis    • Alzheimer disease    • Alzheimer's dementia 3/8/2016   • Anemia      D/T DIVERTICULAR BLEED   • Anxiety    • Atrial fibrillation 3/8/2016   • BPH (benign prostatic hyperplasia)    • Cancer      MELANOMA   • Colon polyps    • Depression    • Disease of thyroid gland    • Diverticulosis    • Essential hypertension 3/8/2016   • HTN (hypertension)    • Hyperlipidemia    • Hypertension    • Hypothyroid    • 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   • Osteoarthritis of multiple joints 3/8/2016   • Osteoporosis    • Osteoporosis    • Partial small bowel obstruction    • Renal insufficiency    • RLS (restless legs syndrome)    • Sleep apnea 3/8/2016   • Venous insufficiency    • Vertigo        Past Surgical History   Procedure Laterality Date   • Foot surgery     • Mohs surgery     • Cardiac electrophysiology procedure N/A 3/11/2016     Procedure: Pacemaker SC new;  Surgeon: Shantanu Rodriguez MD;  Location: [facility] CATH INVASIVE LOCATION;  Service:    • Colonoscopy N/A 12/10/2016     Procedure: COLONOSCOPY INTO CECUM WITH COLD BX POLYPECTOMIES;  Surgeon: Ricci Hensley MD;  Location: [facility] ENDOSCOPY;  Service:    • Fracture surgery Left 2001     ANKLE   • Skin graft Left  2003     EAR   • Eye surgery Bilateral 2011     D/T PTOSIS LIDS   • Joint replacement Right 2006     TOTAL KNEE REPLACEMENT   • Joint replacement Right 2008     KNEE REVISION   • Joint replacement Left 10/2016     SHOULDER-TOTAL       Social History:   reports that he has never smoked. He has never used smokeless tobacco. He reports that he drinks alcohol. He reports that he does not use illicit drugs.      Marriage status:     Family History   Problem Relation Age of Onset   • Parkinsonism Mother    • Heart failure Mother    • Cancer Father    • Heart failure Father    • Heart disease Father    • Heart failure Sister    • Multiple sclerosis Daughter    • Multiple sclerosis Son    • No Known Problems Sister          Current Outpatient Prescriptions:   •  aspirin 81 MG EC tablet, Take 81 mg by mouth daily., Disp: , Rfl:   •  Calcium Citrate-Vitamin D 250-200 MG-UNIT tablet, Take  by mouth., Disp: , Rfl:   •  cefdinir (OMNICEF) 300 MG capsule, TAKE ONE CAPSULE BY MOUTH TWICE A DAY FOR 7 DAYS, Disp: , Rfl: 0  •  citalopram (CeleXA) 20 MG tablet, Take  by mouth daily., Disp: , Rfl:   •  denosumab (PROLIA) 60 MG/ML solution syringe, Inject  under the skin every 6 (six) months., Disp: , Rfl:   •  ferrous sulfate 325 (65 FE) MG tablet, Take 1 tablet by mouth 3 (Three) Times a Day With Meals., Disp: 180 tablet, Rfl: 2  •  furosemide (LASIX) 20 MG tablet, Take 1 tablet by mouth Daily., Disp: 90 tablet, Rfl: 3  •  levothyroxine (SYNTHROID, LEVOTHROID) 125 MCG tablet, Take 137 mcg by mouth daily., Disp: , Rfl:   •  levothyroxine (SYNTHROID, LEVOTHROID) 137 MCG tablet, Take 137 mcg by mouth daily., Disp: , Rfl:   •  Memantine HCl-Donepezil HCl (NAMZARIC PO), Take 10-28 mg by mouth every evening., Disp: , Rfl:   •  modafinil (PROVIGIL) 200 MG tablet, Take  by mouth daily., Disp: , Rfl:   •  Multiple Vitamin (MULTI VITAMIN DAILY PO), Take  by mouth daily., Disp: , Rfl:   •  Multiple Vitamins-Minerals (CENTRUM SILVER PO),  Take  by mouth Daily., Disp: , Rfl:   •  NAMZARIC 28-10 MG capsule sustained-release 24 hr, TAKE 1 CAPSULE BY MOUTH DAILY, Disp: , Rfl: 3  •  POTASSIUM GLUCONATE PO, Take 585 mg by mouth daily., Disp: , Rfl:   •  pramipexole (MIRAPEX) 0.125 MG tablet, Take 0.125 mg by mouth 1 (one) time., Disp: , Rfl:   •  QUEtiapine (SEROquel) 50 MG tablet, Take 50 mg by mouth Every Night., Disp: , Rfl:   •  simvastatin (ZOCOR) 40 MG tablet, , Disp: , Rfl:   •  tamsulosin (FLOMAX) 0.4 MG capsule 24 hr capsule, Take  by mouth., Disp: , Rfl:   •  tamsulosin (FLOMAX) 0.4 MG capsule 24 hr capsule, Take 1 capsule by mouth Daily., Disp: 90 capsule, Rfl: 3  •  traMADol (ULTRAM) 50 MG tablet, 50 mg 2 (Two) Times a Day., Disp: , Rfl: 3  •  valsartan (DIOVAN) 320 MG tablet, Take 1 tablet by mouth Daily., Disp: 90 tablet, Rfl: 3    Allergy  No known drug allergy    Review of Systems   HENT: Positive for hearing loss.    Respiratory: Positive for shortness of breath.    Gastrointestinal: Positive for bowel incontinence.   Neurological: Positive for excessive daytime sleepiness.   All other systems reviewed and are negative.      Vitals:    01/06/17 1015   BP: 122/78   Pulse: 61   Resp: 20   Temp: 97.8 °F (36.6 °C)   SpO2: 97%     Body mass index is 28.5 kg/(m^2).    Physical Exam   Constitutional: He is oriented to person, place, and time. He appears well-developed and well-nourished. No distress.   HENT:   Head: Normocephalic and atraumatic.   Nose: Nose normal.   Mouth/Throat: Oropharynx is clear and moist.   Eyes: Conjunctivae and EOM are normal. Pupils are equal, round, and reactive to light.   Neck: Normal range of motion. No tracheal deviation present.   Pulmonary/Chest: Effort normal and breath sounds normal. No respiratory distress.   Abdominal: Soft. Bowel sounds are normal. He exhibits no distension.   Genitourinary:   Genitourinary Comments: Perianal exam: external hem - wnl  LOREN- good tone, no masses  Anoscopy performed:  Grade 1 x  3 internal hem     Musculoskeletal: Normal range of motion. He exhibits no edema or deformity.   Neurological: He is alert and oriented to person, place, and time. No cranial nerve deficit. Coordination and gait normal.   Skin: Skin is warm and dry.   Psychiatric: He has a normal mood and affect. His behavior is normal. Judgment normal.       Review of Medical Record:I reviewed notes from hospitalization from 12/16 which includes consults from GS and GI, a colonoscopy which was negative, and d/c summary.  Assessment:  1. Internal hemorrhoids without complication    2. External hemorrhoids without complication    3. Alzheimer's disease of other onset without behavioral disturbance        Plan:  For the hemorrhoids, they're nonsurgical at this point. I would encourage pt to take fiber and stool softeners as needed.  RTC prn.    EMR Dragon/Transcription disclaimer:   Much of this encounter note is an electronic transcription/translation of spoken language to printed text. The electronic translation of spoken language may permit erroneous, or at times, nonsensical words or phrases to be inadvertently transcribed; Although I have reviewed the note for such errors, some may still exist.

## 2017-02-02 NOTE — TELEPHONE ENCOUNTER
Pacer checked remotely today and he has had 2 nst episodes, 9 and 11 beats each that are suspicious for vt.

## 2017-03-10 PROBLEM — R10.9 ABDOMINAL PAIN: Status: ACTIVE | Noted: 2017-01-01

## 2017-03-10 PROBLEM — I49.5 SICK SINUS SYNDROME (HCC): Chronic | Status: ACTIVE | Noted: 2017-01-01

## 2017-03-10 PROBLEM — Z95.0 PACEMAKER: Chronic | Status: ACTIVE | Noted: 2017-01-01

## 2017-03-10 PROBLEM — Z01.810 PREOP CARDIOVASCULAR EXAM: Status: ACTIVE | Noted: 2017-01-01

## 2017-03-10 NOTE — CONSULTS
CC:  Abdominal pain    HPI:  85-year-old gentleman whose history is obtained from his family as he has received Ativan and narcotics and is not coherent.  He developed sudden onset of severe abdominal pain initially in the left lower abdomen but now more generalized yesterday around midnight.  This was associated with nausea but no emesis.  He had an episode of diverticular hemorrhage that he was admitted for in December but during this episode has had no bleeding.    ROS:  No chest pain or shortness of air.  Negative for unexpected weight loss or diarrhea.  All other systems reviewed and negative other than presenting complaints.    PSH:    No previous abdominal surgery  Knee, ankle, shoulder surgery  Blepharoplasty  Pacemaker 2006    PMH:    Diverticulosis  Alzheimer's dementia  Hypertension  Depression  Hypothyroidism  Atrial fibrillation (not on anticoagulation)  Hyperlipidemia    MEDICATIONS: reviewed and reconciled in EMR    ALLERGIES: reviewed and reconciled in EMR    FAMILY HISTORY:  Negative for colorectal cancer    SOCIAL HISTORY:   Denies tobacco use  Denies alcohol use    PHYSICAL EXAM:   Constitutional: Well-developed well-nourished, no acute distress   Heart rate 61, /93, RR 18   Weight (lbs) 188   BMI 27   Height 70 inches  Eyes: Conjunctiva normal, sclera nonicteric  ENMT: Hearing grossly normal, oral mucosa moist  Neck: Supple, no palpable mass, normal thyroid, trachea midline  Respiratory: Clear to auscultation, normal inspiratory effort  Cardiovascular: Regular rate, no murmur, no carotid bruit, no peripheral edema, no jugular venous distention  Gastrointestinal: Soft, diffusely moderately tender to palpation, his level of confusion markedly decreases reliability of exam, no palpable mass, no hepatosplenomegaly, negative for hernia, bowel sounds normal   Lymphatics: Negative for  cervical  axillary  inguinal  adenopathy  Skin:  Warm, dry, no rash on visualized skin surfaces  Psychiatric:  Confused and agitated, prior to this episode per family his mental status is quite clear     RADIOLOGY:    CT abdomen pelvis 3/10/17 Gnosticism: Mild diverticulitis of the mid sigmoid colon, small amount of sludge or very tiny gallstones, 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  I reviewed the images and concur that he has some mild degree of diverticulitis but certainly there is no discrete abnormality of the small intestine    LABS:    WBC 6.89 (01:16 today), WBC 12.74 (13:57 today),  , hemoglobin 14.2, platelets 227, glucose 150, AST 55 on the lipase 45, lactate 1.7 (01:16 today), lactate 4.4 (13:57 today)    CLINICAL SUMMARY (A/P):    85-year-old gentleman with severe abdominal pain, findings of mild diverticulitis on CT and now increasing white blood cell count and lactate level.  In light of his presentation, exam and increasing WBC and lactate level I recommended emergently proceeding with laparoscopy, possible laparotomy to evaluate for ischemic bowel.  His family understands the rationale for the procedure, the nature of the procedure and the risks.    Lalo Zeng M.D.

## 2017-03-10 NOTE — ED NOTES
"Pt on commode for 5-7 min, pt reports severe pain, requesting pain medicine. This RN explained to pt that we had to get him back to bed before he's given morphine to avoid a fall. Pt stated \"i've been here 3 hours and haven't had any pain meds\" this RN re-oriented pt to timeframe, here for less than an hour.     Nelli Ribera, RN  03/10/17 0129    "

## 2017-03-10 NOTE — PROGRESS NOTES
History:     This is an 85-year-old gentleman.  He has a history of diverticular disease, atrial fib, Alzheimer's, obstructive sleep apnea (not on treatment) and hypertension.  The patient was admitted early this morning with severe abdominal pain.  His exam was unremarkable.  His lab studies were normal.  He was given medication to decrease his blood pressure.  He was given pain medications and sedative medications.  When I examined the patient he was somewhat lethargic, could not give a history and had an unimpressive abdominal exam.  I came back to examine the patient a few hours later.  He was more awake.  He was confused and moving about in the bed.  He was having significant lower abdominal discomfort.    Allergies  No known drug allergy      Current Facility-Administered Medications:   •  dextrose 5 % and sodium chloride 0.45 % with KCl 20 mEq/L infusion, 100 mL/hr, Intravenous, Continuous, Papo Becker MD, Last Rate: 100 mL/hr at 03/10/17 1146, 100 mL/hr at 03/10/17 1146  •  [START ON 3/11/2017] levothyroxine sodium injection 125 mcg, 125 mcg, Intravenous, Q AM, Papo Becker MD  •  metoprolol succinate XL (TOPROL-XL) 24 hr tablet 25 mg, 25 mg, Oral, Daily, Papo Becker MD  •  metroNIDAZOLE (FLAGYL) IVPB 500 mg, 500 mg, Intravenous, Q8H, Papo Becker MD  •  morphine injection 1 mg, 1 mg, Intravenous, Q4H PRN, 1 mg at 03/10/17 1126 **AND** naloxone (NARCAN) injection 0.4 mg, 0.4 mg, Intravenous, Q5 Min PRN, Papo Becker MD  •  ondansetron (ZOFRAN) injection 4 mg, 4 mg, Intravenous, Q6H PRN, Papo Becker MD, 4 mg at 03/10/17 1125  •  [START ON 3/11/2017] pantoprazole (PROTONIX) injection 40 mg, 40 mg, Intravenous, Q AM, Papo Becker MD  •  piperacillin-tazobactam (ZOSYN) 3.375 g in dextrose 50 mL IVPB (premix), 3.375 g, Intravenous, Q8H, Lalo Zeng MD  •  sodium chloride 0.9 % flush 1-10 mL, 1-10 mL, Intravenous, PRN, Papo Becker MD    Visit Vitals   • /93 (BP Location: Right arm, Patient  "Position: Lying)   • Pulse 62   • Temp 97.4 °F (36.3 °C) (Oral)   • Resp 18   • Ht 70\" (177.8 cm)   • Wt 188 lb (85.3 kg)   • SpO2 98%   • BMI 26.98 kg/m2       Physical Exam    Appearance elderly  gentleman.  He is writhing about in bed.  He has obvious abdominal discomfort.  He is oriented ×0.    HEENT: Unremarkable.    Lungs: Clear to auscultation percussion.    Heart: Regular, bradycardic.    Abdomen: Significant low abdominal discomfort.  The patient has some rebound but no real guarding.  He had absent abdominal bowel sounds.    Extremities: Without clubbing, cyanosis or edema.    Lab Results (last 24 hours)     Procedure Component Value Units Date/Time    CBC & Differential [[id number]] Collected:  03/10/17 0043    Specimen:  Blood Updated:  03/10/17 0108    Narrative:       The following orders were created for panel order CBC & Differential.  Procedure                               Abnormality         Status                     ---------                               -----------         ------                     CBC Auto Differential[[id number]]         Abnormal            Final result                 Please view results for these tests on the individual orders.    CBC Auto Differential [[id number]]  (Abnormal) Collected:  03/10/17 0043    Specimen:  Blood Updated:  03/10/17 0108     WBC 6.89 10*3/mm3      RBC 4.51 (L) 10*6/mm3      Hemoglobin 14.2 g/dL      Hematocrit 44.0 %      MCV 97.6 (H) fL      MCH 31.5 pg      MCHC 32.3 (L) g/dL      RDW 14.8 (H) %      RDW-SD 52.4 fl      MPV 10.5 fL      Platelets 227 10*3/mm3      Neutrophil % 73.4 %      Lymphocyte % 16.7 (L) %      Monocyte % 7.3 %      Eosinophil % 2.2 %      Basophil % 0.4 %      Immature Grans % 0.0 %      Neutrophils, Absolute 5.06 10*3/mm3      Lymphocytes, Absolute 1.15 10*3/mm3      Monocytes, Absolute 0.50 10*3/mm3      Eosinophils, Absolute 0.15 10*3/mm3      Basophils, Absolute 0.03 10*3/mm3      Immature Grans, Absolute 0.00 " 10*3/mm3     Comprehensive Metabolic Panel [[id number]]  (Abnormal) Collected:  03/10/17 0043    Specimen:  Blood Updated:  03/10/17 0129     Glucose 150 (H) mg/dL      BUN 18 mg/dL      Creatinine 1.18 mg/dL      Sodium 142 mmol/L      Potassium 3.9 mmol/L      Chloride 103 mmol/L      CO2 24.1 mmol/L      Calcium 10.0 mg/dL      Total Protein 7.6 g/dL      Albumin 4.40 g/dL      ALT (SGPT) 33 U/L      AST (SGOT) 55 (H) U/L      Alkaline Phosphatase 87 U/L      Total Bilirubin 0.6 mg/dL      eGFR Non African Amer 59 (L) mL/min/1.73      Globulin 3.2 gm/dL      A/G Ratio 1.4 g/dL      BUN/Creatinine Ratio 15.3      Anion Gap 14.9 mmol/L     Narrative:       The MDRD GFR formula is only valid for adults with stable renal function between ages 18 and 70.    Lipase [[id number]]  (Normal) Collected:  03/10/17 0043    Specimen:  Blood Updated:  03/10/17 0129     Lipase 45 U/L     Lactic Acid, Plasma [[id number]]  (Normal) Collected:  03/10/17 0116    Specimen:  Blood Updated:  03/10/17 0135     Lactate 1.7 mmol/L     Troponin [[id number]]  (Normal) Collected:  03/10/17 0043    Specimen:  Blood Updated:  03/10/17 0205     Troponin T <0.010 ng/mL     Narrative:       Troponin T Reference Ranges:  Less than 0.03 ng/mL:    Negative for AMI  0.03 to 0.09 ng/mL:      Indeterminant for AMI  Greater than 0.09 ng/mL: Positive for AMI    Urinalysis With / Culture If Indicated [[id number]]  (Abnormal) Collected:  03/10/17 0636    Specimen:  Urine from Urine, Clean Catch Updated:  03/10/17 0659     Color, UA Dark Yellow (A)      Appearance, UA Cloudy (A)      pH, UA <=5.0      Specific Gravity, UA 1.025      Glucose, UA Negative      Ketones, UA Negative      Bilirubin, UA Negative      Blood, UA Negative      Protein,  mg/dL (2+) (A)      Leuk Esterase, UA Negative      Nitrite, UA Negative      Urobilinogen, UA 0.2 E.U./dL     Urine Culture [[id number]] Collected:  03/10/17 0636    Specimen:  Urine from Urine, Clean Catch Updated:   03/10/17 0719    Urinalysis, Microscopic Only [[id number]]  (Abnormal) Collected:  03/10/17 0636    Specimen:  Urine from Urine, Clean Catch Updated:  03/10/17 0719     RBC, UA 0-2 /HPF      WBC, UA 0-2 /HPF      Bacteria, UA 1+ (A) /HPF      Squamous Epithelial Cells, UA 0-2 /HPF      Hyaline Casts, UA 13-20 /LPF      Granular Casts, UA 3-6 /LPF      Methodology Manual Light Microscopy     CBC & Differential [[id number]] Collected:  03/10/17 1357    Specimen:  Blood Updated:  03/10/17 1402    Narrative:       The following orders were created for panel order CBC & Differential.  Procedure                               Abnormality         Status                     ---------                               -----------         ------                     CBC Auto Differential[[id number]]                             In process                   Please view results for these tests on the individual orders.    CBC Auto Differential [[id number]] Collected:  03/10/17 1357    Specimen:  Blood Updated:  03/10/17 1402    Lactic Acid, Plasma [[id number]]  (Abnormal) Collected:  03/10/17 1357    Specimen:  Blood Updated:  03/10/17 1422     Lactate 4.4 (C) mmol/L         EKG: Underlying rhythm is atrial fib.  The patient has bradycardic rhythm at about 60.  He has wide complex QRS in a left bundle pattern    Imp:    1.  Acute abdomen.  Possibly related to ischemic bowel.  The patient is on broad-spectrum antibiotics.  He has been seen by general surgery.    2.  Alzheimer's dementia.    3.  Sick sinus syndrome, atrial fibrillation.  Patient has a pacemaker.    4.  Essential hypertension BP elevated on admission.      5.  Obstructive sleep apnea.  Patient has not been compliant with his CPAP.  He has elevated pulmonary pressures.      Plan:    The patient is nothing by mouth.    He is receiving IV fluids.    He is on broad-spectrum antibiotics.    I'm treating with IV metoprolol to control his blood pressure.    He will be seen by cardiology.   He is being seen by general surgery.    Papo Becker MD  3/10/2017  2:45 PM

## 2017-03-10 NOTE — ED NOTES
"Pt is very confused and combative. Pt was yelling \"I am going home\" and ripping all over his cords, oxygen and gown off. MD notified and new orders being placed.      Charo Wolf RN  03/10/17 0652    "

## 2017-03-10 NOTE — ANESTHESIA PROCEDURE NOTES
Airway  Urgency: elective    Airway not difficult    General Information and Staff    Patient location during procedure: OR  Anesthesiologist: CHAYITO GRANDA    Indications and Patient Condition  Indications for airway management: airway protection    Preoxygenated: yes  Mask difficulty assessment: 1 - vent by mask    Final Airway Details  Final airway type: endotracheal airway      Successful airway: ETT  Cuffed: yes   Successful intubation technique: direct laryngoscopy  Endotracheal tube insertion site: oral  Blade: Anitha  Blade size: #3  ETT size: 8.0 mm  Cormack-Lehane Classification: grade I - full view of glottis  Placement verified by: chest auscultation and capnometry   Measured from: lips  ETT to lips (cm): 24  Number of attempts at approach: 1

## 2017-03-10 NOTE — ED NOTES
Assisted pt to bedside commode, pt on commode for aprox 10 min, assisted back to bed.      Nelli Ribera RN  03/10/17 0127

## 2017-03-10 NOTE — ED PROVIDER NOTES
EMERGENCY DEPARTMENT ENCOUNTER    CHIEF COMPLAINT  Chief Complaint: Abdominal pain  History given by: patient   History limited by: vague historian  Room Number: 07/07  PMD: Papo Becker MD      HPI:  Pt is a 85 y.o. male who presents complaining of generalized, severe abd pain that began 2 hours ago. Pt states that the pain radiates into his back. Pt also reports SOA for the last week. Pt also vaguely reports mid sternal chest pain. Hx of diverticulitis     Duration:  2 hours  Onset: sudden  Timing: constant  Location: generalized  Radiation: none  Quality: pain  Intensity/Severity: severe   Progression: unchanged   Associated Symptoms: SOA, mid sternal CP  Aggravating Factors: none  Alleviating Factors: none  Previous Episodes: hx of diverticulitis  Treatment before arrival: none    PAST MEDICAL HISTORY  Active Ambulatory Problems     Diagnosis Date Noted   • Alzheimer's dementia 03/08/2016   • Sleep apnea 03/08/2016   • Atrial fibrillation 03/08/2016   • Essential hypertension 03/08/2016   • Osteoarthritis of multiple joints 03/08/2016   • Restless leg syndrome 03/08/2016   • Hyperlipidemia 03/08/2016   • Acquired hypothyroidism 03/08/2016   • HTN (hypertension)      Resolved Ambulatory Problems     Diagnosis Date Noted   • Tibia/fibula fracture 03/08/2016   • Arrhythmia 03/08/2016   • Acute lower GI bleeding 12/09/2016     Past Medical History   Diagnosis Date   • Acquired pes planus    • Actinic keratosis    • Alzheimer disease    • Anemia    • Anxiety    • BPH (benign prostatic hyperplasia)    • Cancer    • Colon polyps    • Depression    • Disease of thyroid gland    • Diverticulosis    • Hypertension    • Hypothyroid    • Neoplasm of pituitary gland    • Osteoporosis    • Osteoporosis    • Partial small bowel obstruction    • Renal insufficiency    • RLS (restless legs syndrome)    • Venous insufficiency    • Vertigo        PAST SURGICAL HISTORY  Past Surgical History   Procedure Laterality Date   • Foot  surgery     • Mohs surgery     • Cardiac electrophysiology procedure N/A 3/11/2016     Procedure: Pacemaker SC new;  Surgeon: Shantanu Rodriguez MD;  Location: [facility] CATH INVASIVE LOCATION;  Service:    • Colonoscopy N/A 12/10/2016     Procedure: COLONOSCOPY INTO CECUM WITH COLD BX POLYPECTOMIES;  Surgeon: Ricci Hensley MD;  Location: [facility] ENDOSCOPY;  Service:    • Fracture surgery Left 2001     ANKLE   • Skin graft Left 2003     EAR   • Eye surgery Bilateral 2011     D/T PTOSIS LIDS   • Joint replacement Right 2006     TOTAL KNEE REPLACEMENT   • Joint replacement Right 2008     KNEE REVISION   • Joint replacement Left 10/2016     SHOULDER-TOTAL       FAMILY HISTORY  Family History   Problem Relation Age of Onset   • Parkinsonism Mother    • Heart failure Mother    • Cancer Father    • Heart failure Father    • Heart disease Father    • Heart failure Sister    • Multiple sclerosis Daughter    • Multiple sclerosis Son    • No Known Problems Sister        SOCIAL HISTORY  Social History     Social History   • Marital status:      Spouse name: N/A   • Number of children: N/A   • Years of education: N/A     Occupational History   • Not on file.     Social History Main Topics   • Smoking status: Never Smoker   • Smokeless tobacco: Never Used   • Alcohol use Yes      Comment: RARELY   • Drug use: No   • Sexual activity: Defer     Other Topics Concern   • Not on file     Social History Narrative       ALLERGIES  No known drug allergy    REVIEW OF SYSTEMS  Review of Systems   Constitutional: Negative for chills and fever.   HENT: Negative for congestion and sore throat.    Eyes: Negative.    Respiratory: Positive for shortness of breath. Negative for cough.    Cardiovascular: Positive for chest pain. Negative for leg swelling.   Gastrointestinal: Positive for abdominal pain. Negative for diarrhea and vomiting.   Genitourinary: Negative for difficulty urinating and dysuria.   Musculoskeletal: Negative for back pain  and neck pain.   Skin: Negative for rash and wound.   Allergic/Immunologic: Negative.    Neurological: Negative for dizziness, weakness, numbness and headaches.   Psychiatric/Behavioral: Negative.    All other systems reviewed and are negative.      PHYSICAL EXAM  ED Triage Vitals   Temp Heart Rate Resp BP SpO2   03/10/17 0019 03/10/17 0019 03/10/17 0019 03/10/17 0019 03/10/17 0019   97.4 °F (36.3 °C) 66 16 150/92 98 %      Temp src Heart Rate Source Patient Position BP Location FiO2 (%)   03/10/17 0019 -- -- -- --   Oral           Physical Exam   Constitutional: He is oriented to person, place, and time and well-developed, well-nourished, and in no distress. He appears distressed.   HENT:   Head: Normocephalic and atraumatic.   Eyes: EOM are normal. Pupils are equal, round, and reactive to light.   Neck: Normal range of motion. Neck supple.   Cardiovascular: Normal rate, regular rhythm and normal heart sounds.    Pulmonary/Chest: Effort normal and breath sounds normal. No respiratory distress.   Abdominal: Soft. There is tenderness (diffuse, lower). There is no rebound and no guarding.   Genitourinary: Rectal exam shows guaiac negative stool.   Musculoskeletal: Normal range of motion. He exhibits no edema.   Neurological: He is alert and oriented to person, place, and time. He has normal sensation and normal strength.   Skin: Skin is warm and dry.   Psychiatric: Mood and affect normal.   Nursing note and vitals reviewed.      LAB RESULTS  Lab Results (last 24 hours)     Procedure Component Value Units Date/Time    CBC & Differential [[id number]] Collected:  03/10/17 0043    Specimen:  Blood Updated:  03/10/17 0108    Narrative:       The following orders were created for panel order CBC & Differential.  Procedure                               Abnormality         Status                     ---------                               -----------         ------                     CBC Auto Differential[[id number]]          Abnormal            Final result                 Please view results for these tests on the individual orders.    Comprehensive Metabolic Panel [[id number]]  (Abnormal) Collected:  03/10/17 0043    Specimen:  Blood Updated:  03/10/17 0129     Glucose 150 (H) mg/dL      BUN 18 mg/dL      Creatinine 1.18 mg/dL      Sodium 142 mmol/L      Potassium 3.9 mmol/L      Chloride 103 mmol/L      CO2 24.1 mmol/L      Calcium 10.0 mg/dL      Total Protein 7.6 g/dL      Albumin 4.40 g/dL      ALT (SGPT) 33 U/L      AST (SGOT) 55 (H) U/L      Alkaline Phosphatase 87 U/L      Total Bilirubin 0.6 mg/dL      eGFR Non African Amer 59 (L) mL/min/1.73      Globulin 3.2 gm/dL      A/G Ratio 1.4 g/dL      BUN/Creatinine Ratio 15.3      Anion Gap 14.9 mmol/L     Narrative:       The MDRD GFR formula is only valid for adults with stable renal function between ages 18 and 70.    Lipase [[id number]]  (Normal) Collected:  03/10/17 0043    Specimen:  Blood Updated:  03/10/17 0129     Lipase 45 U/L     CBC Auto Differential [[id number]]  (Abnormal) Collected:  03/10/17 0043    Specimen:  Blood Updated:  03/10/17 0108     WBC 6.89 10*3/mm3      RBC 4.51 (L) 10*6/mm3      Hemoglobin 14.2 g/dL      Hematocrit 44.0 %      MCV 97.6 (H) fL      MCH 31.5 pg      MCHC 32.3 (L) g/dL      RDW 14.8 (H) %      RDW-SD 52.4 fl      MPV 10.5 fL      Platelets 227 10*3/mm3      Neutrophil % 73.4 %      Lymphocyte % 16.7 (L) %      Monocyte % 7.3 %      Eosinophil % 2.2 %      Basophil % 0.4 %      Immature Grans % 0.0 %      Neutrophils, Absolute 5.06 10*3/mm3      Lymphocytes, Absolute 1.15 10*3/mm3      Monocytes, Absolute 0.50 10*3/mm3      Eosinophils, Absolute 0.15 10*3/mm3      Basophils, Absolute 0.03 10*3/mm3      Immature Grans, Absolute 0.00 10*3/mm3     Troponin [[id number]]  (Normal) Collected:  03/10/17 0043    Specimen:  Blood Updated:  03/10/17 0205     Troponin T <0.010 ng/mL     Narrative:       Troponin T Reference Ranges:  Less than 0.03 ng/mL:     Negative for AMI  0.03 to 0.09 ng/mL:      Indeterminant for AMI  Greater than 0.09 ng/mL: Positive for AMI    Lactic Acid, Plasma [[id number]]  (Normal) Collected:  03/10/17 0116    Specimen:  Blood Updated:  03/10/17 0135     Lactate 1.7 mmol/L           I ordered the above labs and reviewed the results    RADIOLOGY  CT Abdomen Pelvis Without Contrast   Preliminary Result   1.  Mild diverticulitis of the mid sigmoid colon, no obstruction,   perforation or abscess.    2.  Small amount of sludge or very tiny gallstones, no evidence for   acute cholecystitis.   3.  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 Renal cysts, largest measures 4 cm   on the left.   4.  Diverticulosis of the colon.              XR Chest 1 View   Preliminary Result   1.  Interval increase in cardiac size, pericardial effusion cannot be   excluded.    2.  Further evaluation with echocardiogram or CT scan maybe performed.                   I ordered the above noted radiological studies. Interpreted by radiologist. Discussed with radiologist. Reviewed by me in PACS.       PROCEDURES  Procedures      PROGRESS AND CONSULTS  ED Course     00:25  Labs and CT abd/pelvis ordered for further evaluation.     01:04  Morphine and Zofran ordered to treat pain.     01:29  Repeat Morphine ordered. Advised pt that we are awaiting the CT results. Pt understands and agrees with the plan, all questions answered.    02;29  Pt calling out complaining of pain. Morphine ordered.     02:43  Dilaudid ordered to treat pain.     02:52  BP- (!) 196/121 HR- 60 Temp- 97.4 °F (36.3 °C) (Oral) O2 sat- 96%  Rechecked the patient who is in NAD and is resting comfortably. Advised pt and family of the CT findings of diverticulitis. Plan to admit pt for treatment and pain control. Pt understands and agrees with the plan, all questions answered.    03:06  Call placed to Dr Becker.    04:30  Care turned over to Dr Hartmann pending returned call from Dr Becker     MEDICAL DECISION MAKING  Results  were reviewed/discussed with the patient and they were also made aware of online access. Pt also made aware that some labs, such as cultures, will not be resulted during ER visit and follow up with PMD is necessary.     MDM  Number of Diagnoses or Management Options     Amount and/or Complexity of Data Reviewed  Clinical lab tests: ordered and reviewed (Lactate is 1.7  WBC is 6.89)  Tests in the radiology section of CPT®: ordered and reviewed (CT abd/pelvis shows mild diverticulitis. )  Discussion of test results with the performing providers: yes  Discuss the patient with other providers: yes    Patient Progress  Patient progress: stable         DIAGNOSIS  Final diagnoses:   Abdominal pain, unspecified location   Diverticulitis of intestine without perforation or abscess without bleeding, unspecified part of intestinal tract       DISPOSITION  ADMISSION    Discussed treatment plan and reason for admission with pt/family and admitting physician.  Pt/family voiced understanding of the plan for admission for further testing/treatment as needed.     Latest Documented Vital Signs:  As of 4:35 AM  BP- (!) 203/117 HR- 67 Temp- 97.4 °F (36.3 °C) (Oral) O2 sat- 96%    --  Documentation assistance provided by cristal Diaz for Tyler Kincaid PA-C.  Information recorded by the cristal was done at my direction and has been verified and validated by me.        Nathalie Diaz  03/10/17 0431       EDNA Claros III  03/10/17 0432

## 2017-03-10 NOTE — CONSULTS
Patient Name: Jomar Cabral Jr.  :3/1/1932  85 y.o.    Date of Admission: 3/10/2017  Date of Consultation:  03/10/17  Encounter Provider: Aleks Du III, MD  Place of Service: [facility] CARDIOLOGY  Referring Provider: Papo Becker MD  Patient Care Team:  Papo Becker MD as PCP - General  Papo Becker MD as PCP - Family Medicine  Micheal Ye MD as Consulting Physician (Ophthalmology)  Raji Fraire MD as Consulting Physician (Dermatology)  Keshawn Wong MD as Consulting Physician (Cardiac Electrophysiology)  Arcenio Bertrand MD as Consulting Physician (General Surgery)  Regan Pedraza Jr., MD as Consulting Physician (Urology)      Chief complaint: Consulted for preop clearance     History of Present Illness:     This is an 85-year-old gentleman who we've been asked to see for assessment of cardiac risk for potential exploratory lap.  It is felt that he potentially has an acute abdomen. CT of the abdomen showed mild diverticulitis of the sigmoid colon, no obstruction perforation or abscess. 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 Lactic acid is 4.4; his white cells are elevated at 12.7 with a left shift.    Patient has a history of permanent atrial fibrillation as well as symptomatic bradycardia and has a pacemaker in place.  He was last seen in our office by Dr. Rodriguez 2016.  At that point he was doing well with no specific issues.  Other then his atrial fibrillation and sick sinus syndrome he has no other cardiac history.  No known history of CAD or myocardial infarction.  No history of heart failure.    Patient has issues with instability and has fallen in the past.  Because of that he is not on anticoagulation despite his atrial fibrillation.  He does have a history of hypertension and hyperlipidemia.  He has a history of dementia; he has sleep apnea but is noncompliant with CPAP because of his dementia.  His wife states he has  not been having complaints of chest pain, just abdominal pain.  No evidence of shortness of breath.  He has not complained of being short of breath.  No episodes of syncope.  No known issues of presyncope.  They've not seen any lower extremity edema.  There is been no evidence of orthopnea or PND.      Device check in Feb 2017:    and Model: [employer] L100 ESSENTIO Device Type: Pacemaker  Initial Latitude transmission received and reviewed. Rhythm at time of transmission is  60. There is an alert for low intrinsic amplitude. Trending  reviewed and it is intermittent. Other than than lead trending is stable. 2 nst episodes 9 and 11 beats each.          Echo in 3/2016:  · Left ventricular function is mildly decreased. Estimated EF = 45%.  · There is severe septal hypokinesis.  · Right ventricular cavity is moderately dilated  · Left atrial cavity size is mildly dilated. Saline test results are negative.  · Mild aortic valve regurgitation is present  · Moderate regurgitation is present. The calculated RVSP is 57 mm Hg (moderate pulmonary hypertension).  · There is no evidence of pericardial effusion.      Past Medical History   Diagnosis Date   • Acquired hypothyroidism 3/8/2016   • Acquired pes planus    • Actinic keratosis    • Alzheimer disease    • Alzheimer's dementia 3/8/2016   • Anemia      D/T DIVERTICULAR BLEED   • Anxiety    • Atrial fibrillation 3/8/2016   • BPH (benign prostatic hyperplasia)    • Cancer      MELANOMA   • Colon polyps    • Depression    • Disease of thyroid gland    • Diverticulosis    • Essential hypertension 3/8/2016   • HTN (hypertension)    • Hyperlipidemia    • Hypertension    • Hypothyroid    • 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   • Osteoarthritis of multiple joints 3/8/2016   • Osteoporosis    • Osteoporosis    • Partial small bowel obstruction    • Renal insufficiency    • RLS (restless legs syndrome)    • Sleep apnea 3/8/2016   •  Venous insufficiency    • Vertigo        Past Surgical History   Procedure Laterality Date   • Foot surgery     • Mohs surgery     • Cardiac electrophysiology procedure N/A 3/11/2016     Procedure: Pacemaker SC new;  Surgeon: Shantanu Rodriguez MD;  Location:  [facility] CATH INVASIVE LOCATION;  Service:    • Colonoscopy N/A 12/10/2016     Procedure: COLONOSCOPY INTO CECUM WITH COLD BX POLYPECTOMIES;  Surgeon: Ricci Hensley MD;  Location: [facility] ENDOSCOPY;  Service:    • Fracture surgery Left 2001     ANKLE   • Skin graft Left 2003     EAR   • Eye surgery Bilateral 2011     D/T PTOSIS LIDS   • Joint replacement Right 2006     TOTAL KNEE REPLACEMENT   • Joint replacement Right 2008     KNEE REVISION   • Joint replacement Left 10/2016     SHOULDER-TOTAL         Prior to Admission medications    Medication Sig Start Date End Date Taking? Authorizing Provider   aspirin 81 MG EC tablet Take 81 mg by mouth daily.   Yes Historical Provider, MD   Calcium Citrate-Vitamin D 250-200 MG-UNIT tablet Take  by mouth. 12/17/14  Yes Nurse Nadeen Butler RN   citalopram (CeleXA) 20 MG tablet Take  by mouth daily. 9/22/14  Yes Nurse [facility], RN   denosumab (PROLIA) 60 MG/ML solution syringe Inject  under the skin every 6 (six) months. 10/7/14  Yes Nurse Nadeen Butler RN   furosemide (LASIX) 20 MG tablet Take 1 tablet by mouth Daily. 12/12/16  Yes Papo Becker MD   levothyroxine (SYNTHROID, LEVOTHROID) 137 MCG tablet Take 137 mcg by mouth daily.   Yes Historical Provider, MD   Memantine HCl-Donepezil HCl (NAMZARIC PO) Take 10-28 mg by mouth every evening.   Yes Historical Provider, MD   metoprolol succinate XL (TOPROL-XL) 25 MG 24 hr tablet Take 25 mg by mouth Daily.   Yes Historical Provider, MD   modafinil (PROVIGIL) 200 MG tablet Take  by mouth daily. 6/23/14  Yes Nurse [facility], RN   Multiple Vitamin (MULTI VITAMIN DAILY PO) Take  by mouth daily. 12/17/14  Yes Nurse [facility], RN   Multiple Vitamins-Minerals  (CENTRUM SILVER PO) Take  by mouth Daily.   Yes Historical Provider, MD   QUEtiapine (SEROquel) 50 MG tablet Take 50 mg by mouth Every Night.   Yes Historical Provider, MD   simvastatin (ZOCOR) 40 MG tablet  4/16/16  Yes Historical Provider, MD   tamsulosin (FLOMAX) 0.4 MG capsule 24 hr capsule Take  by mouth. 12/17/14  Yes Nurse [facility], RN   traMADol (ULTRAM) 50 MG tablet 50 mg 2 (Two) Times a Day. 1/12/16  Yes Historical Provider, MD   valsartan (DIOVAN) 320 MG tablet Take 1 tablet by mouth Daily. 12/12/16  Yes Papo Becker MD   cefdinir (OMNICEF) 300 MG capsule TAKE ONE CAPSULE BY MOUTH TWICE A DAY FOR 7 DAYS 11/28/16   Historical Provider, MD   ferrous sulfate 325 (65 FE) MG tablet Take 1 tablet by mouth 3 (Three) Times a Day With Meals. 12/12/16   Papo Becker MD   levothyroxine (SYNTHROID, LEVOTHROID) 125 MCG tablet Take 137 mcg by mouth daily. 12/17/14   Nurse [facility], RN   NAMZARIC 28-10 MG capsule sustained-release 24 hr TAKE 1 CAPSULE BY MOUTH DAILY 12/26/15   Historical Provider, MD   POTASSIUM GLUCONATE PO Take 585 mg by mouth daily.    Historical Provider, MD   pramipexole (MIRAPEX) 0.125 MG tablet Take 0.125 mg by mouth 1 (one) time.    Historical Provider, MD   tamsulosin (FLOMAX) 0.4 MG capsule 24 hr capsule Take 1 capsule by mouth Daily. 12/12/16   Papo Becker MD       Allergies   Allergen Reactions   • No Known Drug Allergy        Social History     Social History   • Marital status:      Spouse name: N/A   • Number of children: N/A   • Years of education: N/A     Social History Main Topics   • Smoking status: Never Smoker   • Smokeless tobacco: Never Used   • Alcohol use Yes      Comment: RARELY   • Drug use: No   • Sexual activity: Defer     Other Topics Concern   • None     Social History Narrative       Family History   Problem Relation Age of Onset   • Parkinsonism Mother    • Heart failure Mother    • Cancer Father    • Heart failure Father    • Heart disease Father     • Heart failure Sister    • Multiple sclerosis Daughter    • Multiple sclerosis Son    • No Known Problems Sister        REVIEW OF SYSTEMS:   All systems reviewed.  Pertinent positives identified in HPI.  All other systems are negative.      Objective:     Vitals:    03/10/17 1048 03/10/17 1146 03/10/17 1216 03/10/17 1245   BP: 153/96 149/90 148/85 162/93   BP Location:    Right arm   Patient Position:    Lying   Pulse: 60 59 60 62   Resp: 18 20 20 18   Temp:       TempSrc:       SpO2: 91% 98% 100% 98%   Weight:       Height:         Body mass index is 26.98 kg/(m^2).    General Appearance:    Lethargic, confused, uncomfortable   Head:    Normocephalic, without obvious abnormality, atraumatic   Eyes:            Lids and lashes normal, conjunctivae and sclerae normal, no   icterus, no pallor, corneas clear, PERRLA   Ears:    Ears appear intact with no abnormalities noted   Throat:   No oral lesions, no thrush, oral mucosa moist   Neck:   No adenopathy, supple, trachea midline, no thyromegaly, no   carotid bruit, no JVD   Back:     No kyphosis present, no scoliosis present, no skin lesions, erythema or scars, no tenderness to percussion or palpation, range of motion normal   Lungs:     Clear to auscultation,respirations regular, even and unlabored    Heart:    irregular rhythm and normal rate, normal S1 and S2, no murmur, no gallop, no rub, no click   Chest Wall:    No abnormalities observed   Abdomen:     diminished bowel sounds, no masses, no organomegaly, tender and mildly distended   Extremities:   Moves all extremities well, no edema, no cyanosis, no redness   Pulses:   Pulses palpable and equal bilaterally. Normal radial, carotid, femoral, dorsalis pedis and posterior tibial pulses bilaterally. Normal abdominal aorta   Skin:  Psychiatric:   No bleeding, bruising or rash    Confused, moves all 4 spontaneously       Lab Review:       Results from last 7 days  Lab Units 03/10/17  0043   SODIUM mmol/L 142    POTASSIUM mmol/L 3.9   CHLORIDE mmol/L 103   TOTAL CO2 mmol/L 24.1   BUN mg/dL 18   CREATININE mg/dL 1.18   CALCIUM mg/dL 10.0   BILIRUBIN mg/dL 0.6   ALK PHOS U/L 87   ALT (SGPT) U/L 33   AST (SGOT) U/L 55*   GLUCOSE mg/dL 150*       Results from last 7 days  Lab Units 03/10/17  0043   TROPONIN T ng/mL <0.010       Results from last 7 days  Lab Units 03/10/17  1357   WBC 10*3/mm3 12.74*   HEMOGLOBIN g/dL 14.9   HEMATOCRIT % 47.3   PLATELETS 10*3/mm3 207                            I personally viewed and interpreted the patient's EKG/Telemetry data.        Assessment and Plan:       Principal Problem:    Alzheimer's dementia  Active Problems:    Atrial fibrillation    Abdominal pain    Preop cardiovascular exam    Sick sinus syndrome    Pacemaker    There is no evidence of any acute cardiac issues at this time.  Patient is appropriate from a cardiac standpoint to proceed with the necessary surgical intervention.  We will follow with you.    Aleks Du III, MD  03/10/17  3:19 PM

## 2017-03-10 NOTE — ANESTHESIA PREPROCEDURE EVALUATION
Anesthesia Evaluation        Airway   no difficulty expected  Comment: Noncompliant with exam  Dental      Comment: Noncompliant with exam    Pulmonary    (+) sleep apnea, rhonchi, wheezes,   (-) not a smoker    PE comment: nonlabored  Cardiovascular - normal exam    Rhythm: regular  Rate: normal    (+) pacemaker (?ICD vs pacemaker only) pacemaker, hypertension, dysrhythmias (Sick Sinus Syndrome) Atrial Fib,       Neuro/Psych  (+) dizziness/light headedness (vertigo), psychiatric history (alzheimer's dementia) Depression and Anxiety, dementia,    GI/Hepatic/Renal/Endo    (+)  chronic renal disease CRI, hypothyroidism,     ROS Comment: Lactic Acidosis;  Small Bowel Obstruction;  Abd Pain    Musculoskeletal     Abdominal    Substance History      OB/GYN          Other   (+) arthritis                                 Anesthesia Plan    ASA 4 - emergent     general     intravenous induction   Anesthetic plan and risks discussed with patient and spouse/significant other.

## 2017-03-10 NOTE — H&P
CHIEF COMPLAINTS: Severe right lower quadrant pain, hypertension.     HISTORY OF PRESENT ILLNESS: The patient is an 84-year-old gentleman. He has a history of Alzheimer dementia. He also has hypertension, depression, hypothyroidism and diverticular disease. The patient was doing well yesterday. At about 11:30 in the evening the patient developed severe right lower quadrant pain. The pain came abruptly after eating some [employer]. He had some nausea but no vomiting. He told his wife he needed to go to the [facility] emergency room. He was brought to the emergency room and found to be in extreme pain. His blood pressure was also elevated at 200/110. The patient was given narcotic analgesics. He was given hydralazine, morphine, labetalol and Ativan. The patient became lethargic. His abdominal pain seemed to subside, and his blood pressure decreased. Subsequent evaluation included CT scan of the abdomen and pelvis. He was found to have some sigmoid diverticulitis. He was subsequently given a dose of IV Flagyl.     When I saw the patient, he was lethargic. He was in no distress. He was not able to answer questions. He did not have significant tenderness with palpating his abdomen.     ALLERGIES: No known drug allergies.     CURRENT MEDICATIONS:  1. Seroquel 50 mg daily.  2. Diovan 320 mg daily.   3. Tramadol 50 mg b.i.d. as needed.   4. Aspirin 81 mg daily.   5. Lasix 20 mg daily.  6. Prolia every 6 months.   7. Provigil 200 mg daily.  8. Flomax 0.4 daily.   9. Simvastatin 40 mg daily.   10. Synthroid 137 mcg daily.   11. Centrum Silver daily.   12. Celexa 20 mg daily.   13. Namzaric 10/28 take 1 daily.  14. Glucosamine/chondroitin daily.   15. Calcium 600 b.i.d.   16. Iron 1 t.i.d.     PAST MEDICAL HISTORY:   1. Obstructive sleep apnea, on CPAP.   2. Atrial fibrillation.   3. Essential hypertension.   4. Alzheimer dementia.   5. Depression/anxiety.    6. Restless leg syndrome.   7. Osteoarthritis.   8. Hyperlipidemia.    9. Hypothyroidism.   10. Past malignant melanoma, left facial area, .   11. The patient was admitted for diverticular bleed about 3 months ago.     PAST SURGICAL HISTORY:   1. Surgery for left ankle fracture, .   2. Skin grafting, left ear, .   3. Right total knee replacement, .   4. Revision right total knee, .   5. Blepharoplasty surgery, .   6. Pacemaker, .   7. Left total shoulder, 2016.     VACCINATIONS: The patient is up-to-date with Pneumovax (). He received influenza vaccine in the fall of 2016. I do not believe he has received Prevnar 13.     FAMILY HISTORY: Father  at age 82 of congestive heart failure.  Mother  at age 78. She had Parkinson's and heart failure. The patient had 2 sisters. One  of heart failure, 83. One is still living. She is 86. The patient has 4 children. They range in age from the low 50s to the low 60s. Two have multiple sclerosis.     SOCIAL HISTORY: The patient has never smoked. He does not drink alcohol. He is . He lives with his wife. He is sedentary. He used to enjoy golfing. He used to work as an  in the railroad industry.     REVIEW OF SYSTEMS:  GENERAL: No history of fever, chills or night sweats. No history of recent falls or head trauma.   NEUROLOGIC: The patient has dementia. His memory is very poor. He also has trouble with anxiety and depression.   CARDIORESPIRATORY: No chest pain or shortness of breath.  No cough.   GASTROINTESTINAL: As mentioned above.   GENITOURINARY: Urinates 3 or 4 times a day, once at night. No incontinence.   MUSCULOSKELETAL: No significant muscle, bone or joint aches or pains at this time.   DERMATOLOGIC: Unremarkable.   PSYCHIATRIC: The patient tends to be depressed. He also had some anxiety.     PHYSICAL EXAMINATION:   VITAL SIGNS: Blood pressure 146/86, pulse 56, respiratory rate 16, temperature 97.8. His maximum blood pressure in the emergency room was 204/125.   GENERAL  APPEARANCE: The patient is a lethargic overweight elderly gentleman, who is lying in bed, in no distress. He will not answer questions.  HEENT: Normocephalic, atraumatic. Pupils were small, equal and reactive to light and accommodation. Ears were normal. Pharynx clear. Mucous membranes moist.   NECK: Decreased range of motion. No lymphadenopathy, JVD or thyromegaly.   LUNGS: Clear to auscultation and percussion. No wheeze, rales or rhonchi.   HEART: Distant tones. Bradycardic. Regular. No murmur or gallop.   PERIPHERAL VASCULAR: The patient had good dorsalis pedis and posterior tibial pulses.   ABDOMEN: Protuberant. Significant fat pad. The patient had some tenderness in the right lower quadrant. I could not appreciate any rebound or guarding. Bowel sounds were absent.   BACK: Slight kyphosis. No CVA or spine tenderness. No presacral edema.   EXTREMITIES: No clubbing, cyanosis or edema.   NEUROLOGIC: Oriented x0. No focal findings.     DIAGNOSTIC DATA: Comprehensive metabolic panel was basically all within normal limits except for glucose 150, estimated GFR 59. Lipase 45. Lactate 1.7. White count 6.8, hemoglobin 14, hematocrit 44, MCV 97, platelet count 227,000. Urinalysis was unremarkable except for 100 mg/dL of protein and some hyaline casts. Urine culture is pending. Chest x-ray demonstrated some cardiomegaly, otherwise unremarkable. 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 EKG was a paced rhythm with wide QS complexes; rate was 60.     IMPRESSION:  1. Right lower quadrant pain, probable diverticulitis.   2. Hypertensive urgency.    3. Alzheimer dementia.   4. Chronic venous insufficiency.   5. Obstructive sleep apnea.   6. History of atrial fibrillation and sick sinus syndrome, now with a pacemaker.   7. Hyperlipidemia.   8. Osteoporosis.   9. BPH with  bladder outlet obstruction.   10. Hypothyroidism.   11. Past melanoma.     PLAN:   1. The patient has been admitted to the hospital and given IV fluids.   2. I am going to give him both Levaquin and Flagyl.   3. We will place him n.p.o. for now.   4. We will continue his home medications.   5. I will give him Ativan if he has significant anxiety.     It should be noted this gentleman is a NO CODE.         Papo Becker M.D.  BMO:[email or web address]  D:   03/10/2017 09:35:00  T:   03/10/2017 10:54:56  Job ID:   [id number]  Document ID:   [id number]  cc:

## 2017-03-10 NOTE — SIGNIFICANT NOTE
03/10/17 1514   Rehab Treatment   Discipline physical therapist   Rehab Evaluation   Evaluation Not Performed other (see comments)  (YAMILA Cohen asked for PT to hold off at this time as the pt has a high possibility for surgery soon. PT will wait for plan of treatment and follow up accordingly.)   Recommendation   PT - Next Appointment 03/11/17

## 2017-03-10 NOTE — ED NOTES
Notified MD Hartmann about pt blood pressure and new orders are being placed.      Charo Wolf RN  03/10/17 0511

## 2017-03-10 NOTE — OP NOTE
PREOPERATIVE DIAGNOSIS:  Acute abdomen, acidosis    POSTOPERATIVE DIAGNOSIS AND FINDINGS:  Gangrenous small bowel and ascending colon    PROCEDURE:  Laparoscopy  Exploratory laparotomy    SURGEON:  Lalo Zeng MD    ASSISTANT:  Kendrick Ponce    ANESTHESIA:  General    EBL:  Minimal    DESCRIPTION:  Supine position prepped and draped usual sterile manner.  Small incision made above the umbilicus and Veress needle inserted with upwards traction on the abdominal wall.  Abdomen insufflated to 15 mmHg and 5 mm Optiview trocar inserted.  All visible small bowel appeared gangrenous and I elected to proceed with open laparotomy to assess accurately whether any viable small bowel remaining.  Midline laparotomy incision was made and the small bowel was gangrenous essentially from just past the ligament of Treitz to the ileocecal valve.  Ischemic changes extended to the mid ascending colon.  Fascia was then closed with running 0 PDS and skin with staples.  I informed family of findings and need for palliative measures.    Lalo Zeng M.D.

## 2017-03-10 NOTE — ED PROVIDER NOTES
I supervised care provided by the midlevel provider.    We have discussed this patient's history, physical exam, and treatment plan.   I have reviewed the note and personally saw and examined the patient and agree with the plan of care.      Pt with h/o diverticulitis presents to the ED c/o generalized abd pain radiating to the mid-back onset earlier tonight. On physical exam, pt is in mild distress. Nonfocal neurological exam. Abdomen is soft. There is mild diffuse abdominal tenderness. There is no rebound or guarding. Cardiovascular exam is WNL without murmur. CT Abd shows mild diverticulitis of the mid-sigmoid colon. There is no perforation or abscess. Pt will be admitted to Dr. Becker, MARCY.     Final diagnoses:   Abdominal pain, unspecified location   Diverticulitis of intestine without perforation or abscess without bleeding, unspecified part of intestinal tract   Essential hypertension         Documentation assistance provided by Jean Zhang. Information recorded by the scribe was done at my direction and has been verified and validated by me.     Entered by Jean Zhang, acting as scribe for Dr. Shahbaz MD.        Jean Zhang  03/10/17 0428       Jean Pierre Hartmann MD  03/10/17 0658       Jean Pierre Hartmann MD  03/10/17 0659

## 2017-03-10 NOTE — ED NOTES
MD Hartmann notified of pt's blood pressure continuing to be elevated. New orders placed.      Charo Wolf RN  03/10/17 0604

## 2017-03-11 NOTE — PLAN OF CARE
Problem: Patient Care Overview (Adult)  Goal: Plan of Care Review  Outcome: Ongoing (interventions implemented as appropriate)    03/11/17 6914   Coping/Psychosocial Response Interventions   Plan Of Care Reviewed With family   Patient Care Overview   Progress declining   Outcome Evaluation   Outcome Summary/Follow up Plan Pt unresponsive. Given medication prior to turns x3. Family at bedside educated and supported.        Goal: Adult Individualization and Mutuality  Outcome: Ongoing (interventions implemented as appropriate)  Goal: Discharge Needs Assessment  Outcome: Ongoing (interventions implemented as appropriate)    Problem: Dying Patient, Actively (Adult)  Goal: Comfort/Pain Control  Outcome: Ongoing (interventions implemented as appropriate)  Goal: Dying Process, Peace and Dignity  Outcome: Ongoing (interventions implemented as appropriate)    Problem: Skin Integrity Impairment, Risk/Actual (Adult)  Goal: Skin Integrity/Wound Healing  Outcome: Ongoing (interventions implemented as appropriate)

## 2017-03-11 NOTE — ANESTHESIA POSTPROCEDURE EVALUATION
Patient: Jomar Cabral Jr.    Procedure Summary     Date Anesthesia Start Anesthesia Stop Room / Location    03/10/17 1610 1712  [facility] /  [facility]       Procedure Diagnosis Surgeon Provider    DIAGNOSTIC LAPAROSCOPY POSS LAPAROTOMY (N/A Abdomen); LAPAROTOMY EXPLORATORY (N/A Abdomen) No diagnosis on file. MD Ant Johnson MD          Anesthesia Type: general  Last vitals  /87 (03/10/17 1900)    Temp      Pulse 63 (03/10/17 1900)   Resp 16 (03/10/17 1900)    SpO2 99 % (03/10/17 1900)      Post Anesthesia Care and Evaluation    Patient location during evaluation: PACU  Patient participation: complete - patient participated  Level of consciousness: awake and alert  Pain management: adequate  Airway patency: patent  Anesthetic complications: No anesthetic complications    Cardiovascular status: acceptable  Respiratory status: acceptable  Hydration status: acceptable

## 2017-03-11 NOTE — PROGRESS NOTES
History:    This is a very nice 85-year-old gentleman.  He presented yesterday with acute onset severe lower abdominal pain.  Initial evaluation was unremarkable.  As time progressed his abdominal pain became more severe.  His lactic acid increased markedly over a few hours time.  He was felt to have ischemic colitis.  He underwent exploratory lap.  Patient was found to have acute ischemia of the small bowel and ascending colon.  At surgery he was found to have gangrene of the small bowel and ascending colon.  No resection was performed.  The patient was placed in palliative care.    This a.m. the patient is comfortable.  He is a little tachypnea.  He is not restless.  He has not been communicating.    Allergies  No known drug allergy      Current Facility-Administered Medications:   •  acetaminophen (TYLENOL) tablet 650 mg, 650 mg, Oral, Q4H PRN **OR** acetaminophen (TYLENOL) 160 MG/5ML solution 650 mg, 650 mg, Oral, Q4H PRN **OR** acetaminophen (TYLENOL) suppository 650 mg, 650 mg, Rectal, Q4H PRN, Lalo Zeng MD  •  acetaminophen (TYLENOL) tablet 650 mg, 650 mg, Oral, Q4H PRN **OR** acetaminophen (TYLENOL) 160 MG/5ML solution 650 mg, 650 mg, Oral, Q4H PRN **OR** acetaminophen (TYLENOL) suppository 650 mg, 650 mg, Rectal, Q4H PRN, Papo Becker MD  •  diphenoxylate-atropine (LOMOTIL) 2.5-0.025 MG per tablet 1 tablet, 1 tablet, Oral, Q2H PRN, Lalo Zeng MD  •  diphenoxylate-atropine (LOMOTIL) 2.5-0.025 MG per tablet 1 tablet, 1 tablet, Oral, Q2H PRN, Papo Becker MD  •  Glycerin-Hypromellose- (ARTIFICIAL TEARS) 0.2-0.2-1 % ophthalmic solution solution 1 drop, 1 drop, Both Eyes, Q30 Min PRN, Lalo Zeng MD  •  Glycerin-Hypromellose- (ARTIFICIAL TEARS) 0.2-0.2-1 % ophthalmic solution solution 1 drop, 1 drop, Both Eyes, Q30 Min PRN, Papo Becker MD  •  glycopyrrolate (ROBINUL) injection 0.2 mg, 0.2 mg, Intravenous, Q2H PRN **OR** glycopyrrolate (ROBINUL) injection 0.2 mg, 0.2 mg,  Subcutaneous, Q2H PRN **OR** glycopyrrolate (ROBINUL) injection 0.4 mg, 0.4 mg, Intravenous, Q2H PRN **OR** glycopyrrolate (ROBINUL) injection 0.4 mg, 0.4 mg, Subcutaneous, Q2H PRN, Papo Becker MD  •  HYDROmorphone (DILAUDID) injection 1 mg, 1 mg, Intravenous, Q2H PRN, 1 mg at 03/10/17 2219 **OR** HYDROmorphone (DILAUDID) injection 1 mg, 1 mg, Subcutaneous, Q2H PRN, Papo Becker MD  •  HYDROmorphone (DILAUDID) injection 1.5 mg, 1.5 mg, Intravenous, Q2H PRN **OR** HYDROmorphone (DILAUDID) injection 1.5 mg, 1.5 mg, Subcutaneous, Q2H PRN, Papo Becker MD  •  HYDROmorphone (DILAUDID) injection 2 mg, 2 mg, Intravenous, Q2H PRN, 2 mg at 03/11/17 0729 **OR** HYDROmorphone (DILAUDID) injection 2 mg, 2 mg, Subcutaneous, Q2H PRN, Papo Becker MD  •  LORazepam (ATIVAN) tablet 0.5 mg, 0.5 mg, Oral, Q1H PRN **OR** LORazepam (ATIVAN) injection 0.5 mg, 0.5 mg, Intravenous, Q1H PRN **OR** LORazepam (ATIVAN) injection 1 mg, 1 mg, Intramuscular, Q1H PRN **OR** LORazepam (ATIVAN) 2 MG/ML concentrated solution 0.5 mg, 0.5 mg, Oral, Q1H PRN **OR** LORazepam (ATIVAN) 2 MG/ML concentrated solution 0.5 mg, 0.5 mg, Sublingual, Q1H PRN, Lalo Zeng MD  •  LORazepam (ATIVAN) tablet 1 mg, 1 mg, Oral, Q1H PRN **OR** LORazepam (ATIVAN) injection 1 mg, 1 mg, Intravenous, Q1H PRN, 1 mg at 03/10/17 2219 **OR** LORazepam (ATIVAN) injection 1 mg, 1 mg, Intramuscular, Q1H PRN **OR** LORazepam (ATIVAN) 2 MG/ML concentrated solution 1 mg, 1 mg, Oral, Q1H PRN **OR** LORazepam (ATIVAN) 2 MG/ML concentrated solution 1 mg, 1 mg, Sublingual, Q1H PRN, Lalo Zeng MD  •  LORazepam (ATIVAN) tablet 1 mg, 1 mg, Oral, Q1H PRN **OR** LORazepam (ATIVAN) injection 1 mg, 1 mg, Intravenous, Q1H PRN **OR** LORazepam (ATIVAN) injection 1 mg, 1 mg, Intramuscular, Q1H PRN **OR** LORazepam (ATIVAN) 2 MG/ML concentrated solution 1 mg, 1 mg, Oral, Q1H PRN **OR** LORazepam (ATIVAN) 2 MG/ML concentrated solution 1 mg, 1 mg, Sublingual, Q1H PRN, Papo Becker,  "MD  •  LORazepam (ATIVAN) tablet 2 mg, 2 mg, Oral, Q1H PRN **OR** LORazepam (ATIVAN) injection 2 mg, 2 mg, Intravenous, Q1H PRN, 2 mg at 03/11/17 0729 **OR** LORazepam (ATIVAN) injection 1 mg, 1 mg, Intramuscular, Q1H PRN **OR** LORazepam (ATIVAN) 2 MG/ML concentrated solution 2 mg, 2 mg, Oral, Q1H PRN **OR** LORazepam (ATIVAN) 2 MG/ML concentrated solution 2 mg, 2 mg, Sublingual, Q1H PRN, Lalo Zeng MD  •  Scopolamine (TRANSDERM-SCOP) 1.5 MG/3DAYS patch 1 patch, 1 patch, Transdermal, Q72H PRN, Papo Becker MD    Visit Vitals   • BP (!) 88/55 (BP Location: Left arm, Patient Position: Lying)   • Pulse 67   • Temp 97.9 °F (36.6 °C) (Oral)   • Resp 16   • Ht 70\" (177.8 cm)   • Wt 194 lb 7 oz (88.2 kg)   • SpO2 98%   • BMI 27.9 kg/m2       Physical Exam    Appearance elderly  gentleman.  He is lying comfortably in bed.    Lungs: Rhonchi bilaterally.    Heart: Regular rate and rhythm.    Abdomen: Quiet    Lab Results (last 24 hours)     Procedure Component Value Units Date/Time    Lactic Acid, Plasma [[id number]]  (Abnormal) Collected:  03/10/17 1357    Specimen:  Blood Updated:  03/10/17 1422     Lactate 4.4 (C) mmol/L     CBC & Differential [[id number]] Collected:  03/10/17 1357    Specimen:  Blood Updated:  03/10/17 1500    Narrative:       The following orders were created for panel order CBC & Differential.  Procedure                               Abnormality         Status                     ---------                               -----------         ------                     CBC Auto Differential[[id number]]         Abnormal            Final result                 Please view results for these tests on the individual orders.    CBC Auto Differential [[id number]]  (Abnormal) Collected:  03/10/17 1357    Specimen:  Blood Updated:  03/10/17 1500     WBC 12.74 (H) 10*3/mm3      RBC 4.75 10*6/mm3      Hemoglobin 14.9 g/dL      Hematocrit 47.3 %      MCV 99.6 (H) fL      MCH 31.4 pg      MCHC 31.5 (L) g/dL     "  RDW 14.9 (H) %      RDW-SD 54.6 (H) fl      MPV 10.2 fL      Platelets 207 10*3/mm3      Neutrophil % 91.3 (H) %      Lymphocyte % 4.3 (L) %      Monocyte % 4.1 (L) %      Eosinophil % 0.0 (L) %      Basophil % 0.1 %      Immature Grans % 0.2 %      Neutrophils, Absolute 11.63 (H) 10*3/mm3      Lymphocytes, Absolute 0.55 (L) 10*3/mm3      Monocytes, Absolute 0.52 10*3/mm3      Eosinophils, Absolute 0.00 10*3/mm3      Basophils, Absolute 0.01 10*3/mm3      Immature Grans, Absolute 0.03 10*3/mm3     Urine Culture [[id number]]  (Normal) Collected:  03/10/17 0636    Specimen:  Urine from Urine, Clean Catch Updated:  03/11/17 0658     Urine Culture No growth           Imp:    1.  Acute ischemia to small bowel and ascending colon.  Likely embolic in nature.  Resulting in gangrene.    2.  Alzheimer's dementia.    3.  Atherosclerotic cardiovascular disease.    4.  Essential hypertension.    5.  Hyperlipidemia.    6.  Hypothyroid.      Plan:     The patient's prognosis is grim.  We will continue palliative measures.  The patient is a no code.    Papo Becker MD  3/11/2017  8:39 AM

## 2017-03-12 NOTE — PROGRESS NOTES
History:    This is an 89-year-old gentleman.  He has been diagnosed with ischemic bowel with gangrene.  The patient is currently in palliative care.  He's been having fever.  He's not been in pain.  He's had no dyspnea.  His family has been at his bedside.    Allergies  No known drug allergy      Current Facility-Administered Medications:   •  acetaminophen (TYLENOL) tablet 650 mg, 650 mg, Oral, Q4H PRN **OR** acetaminophen (TYLENOL) 160 MG/5ML solution 650 mg, 650 mg, Oral, Q4H PRN **OR** acetaminophen (TYLENOL) suppository 650 mg, 650 mg, Rectal, Q4H PRN, Lalo Zeng MD  •  acetaminophen (TYLENOL) tablet 650 mg, 650 mg, Oral, Q4H PRN **OR** acetaminophen (TYLENOL) 160 MG/5ML solution 650 mg, 650 mg, Oral, Q4H PRN **OR** acetaminophen (TYLENOL) suppository 650 mg, 650 mg, Rectal, Q4H PRN, Papo Becker MD  •  diphenoxylate-atropine (LOMOTIL) 2.5-0.025 MG per tablet 1 tablet, 1 tablet, Oral, Q2H PRN, Lalo Zeng MD  •  diphenoxylate-atropine (LOMOTIL) 2.5-0.025 MG per tablet 1 tablet, 1 tablet, Oral, Q2H PRN, Papo Becker MD  •  Glycerin-Hypromellose- (ARTIFICIAL TEARS) 0.2-0.2-1 % ophthalmic solution solution 1 drop, 1 drop, Both Eyes, Q30 Min PRN, Lalo Zeng MD  •  Glycerin-Hypromellose- (ARTIFICIAL TEARS) 0.2-0.2-1 % ophthalmic solution solution 1 drop, 1 drop, Both Eyes, Q30 Min PRN, Papo Becker MD  •  glycopyrrolate (ROBINUL) injection 0.2 mg, 0.2 mg, Intravenous, Q2H PRN **OR** glycopyrrolate (ROBINUL) injection 0.2 mg, 0.2 mg, Subcutaneous, Q2H PRN **OR** glycopyrrolate (ROBINUL) injection 0.4 mg, 0.4 mg, Intravenous, Q2H PRN, 0.4 mg at 03/12/17 0535 **OR** glycopyrrolate (ROBINUL) injection 0.4 mg, 0.4 mg, Subcutaneous, Q2H PRN, Papo Becker MD  •  HYDROmorphone (DILAUDID) injection 1 mg, 1 mg, Intravenous, Q2H PRN, 1 mg at 03/10/17 0076 **OR** HYDROmorphone (DILAUDID) injection 1 mg, 1 mg, Subcutaneous, Q2H PRN, Papo Becker MD  •  HYDROmorphone (DILAUDID) injection 1.5  mg, 1.5 mg, Intravenous, Q2H PRN **OR** HYDROmorphone (DILAUDID) injection 1.5 mg, 1.5 mg, Subcutaneous, Q2H PRN, Papo Becker MD  •  HYDROmorphone (DILAUDID) injection 2 mg, 2 mg, Intravenous, Q2H PRN, 2 mg at 03/12/17 0442 **OR** HYDROmorphone (DILAUDID) injection 2 mg, 2 mg, Subcutaneous, Q2H PRN, Papo Becker MD, 2 mg at 03/12/17 0933  •  LORazepam (ATIVAN) tablet 0.5 mg, 0.5 mg, Oral, Q1H PRN **OR** LORazepam (ATIVAN) injection 0.5 mg, 0.5 mg, Intravenous, Q1H PRN **OR** LORazepam (ATIVAN) injection 1 mg, 1 mg, Intramuscular, Q1H PRN **OR** LORazepam (ATIVAN) 2 MG/ML concentrated solution 0.5 mg, 0.5 mg, Oral, Q1H PRN **OR** LORazepam (ATIVAN) 2 MG/ML concentrated solution 0.5 mg, 0.5 mg, Sublingual, Q1H PRN, Lalo Zeng MD  •  LORazepam (ATIVAN) tablet 1 mg, 1 mg, Oral, Q1H PRN **OR** LORazepam (ATIVAN) injection 1 mg, 1 mg, Intravenous, Q1H PRN, 1 mg at 03/10/17 2219 **OR** LORazepam (ATIVAN) injection 1 mg, 1 mg, Intramuscular, Q1H PRN **OR** LORazepam (ATIVAN) 2 MG/ML concentrated solution 1 mg, 1 mg, Oral, Q1H PRN **OR** LORazepam (ATIVAN) 2 MG/ML concentrated solution 1 mg, 1 mg, Sublingual, Q1H PRN, Lalo Zeng MD  •  LORazepam (ATIVAN) tablet 1 mg, 1 mg, Oral, Q1H PRN **OR** LORazepam (ATIVAN) injection 1 mg, 1 mg, Intravenous, Q1H PRN **OR** LORazepam (ATIVAN) injection 1 mg, 1 mg, Intramuscular, Q1H PRN **OR** LORazepam (ATIVAN) 2 MG/ML concentrated solution 1 mg, 1 mg, Oral, Q1H PRN **OR** LORazepam (ATIVAN) 2 MG/ML concentrated solution 1 mg, 1 mg, Sublingual, Q1H PRN, Papo Becker MD  •  LORazepam (ATIVAN) tablet 2 mg, 2 mg, Oral, Q1H PRN **OR** LORazepam (ATIVAN) injection 2 mg, 2 mg, Intravenous, Q1H PRN, 2 mg at 03/12/17 0122 **OR** LORazepam (ATIVAN) injection 1 mg, 1 mg, Intramuscular, Q1H PRN **OR** LORazepam (ATIVAN) 2 MG/ML concentrated solution 2 mg, 2 mg, Oral, Q1H PRN **OR** LORazepam (ATIVAN) 2 MG/ML concentrated solution 2 mg, 2 mg, Sublingual, Q1H PRN, Lalo Zeng,  "MD  •  Scopolamine (TRANSDERM-SCOP) 1.5 MG/3DAYS patch 1 patch, 1 patch, Transdermal, Q72H PRN, Papo Becker MD, 1 patch at 03/11/17 1945    Visit Vitals   • BP (!) 87/51 (BP Location: Left arm, Patient Position: Lying)   • Pulse 84   • Temp (!) 102.2 °F (39 °C) (Oral)   • Resp 16   • Ht 70\" (177.8 cm)   • Wt 194 lb 7 oz (88.2 kg)   • SpO2 93%   • BMI 27.9 kg/m2       Physical Exam     Appearance elderly  gentleman. He is lying comfortably in bed.     Lungs: Rhonchi bilaterally.     Heart: Regular rate and rhythm.     Abdomen: Quiet      Lab Results (last 24 hours)     ** No results found for the last 24 hours. **          Imp:    1. Acute ischemia to small bowel and ascending colon. Likely embolic in nature. Resulting in gangrene.     2. Alzheimer's dementia.     3. Atherosclerotic cardiovascular disease.     4. Essential hypertension.     5. Hyperlipidemia.     6. Hypothyroid.      Plan:    I spoke with the family.    We'll continue to provide comfort care.    Prognosis is grim.    Papo Becker MD  3/12/2017  10:12 AM  "

## 2017-03-12 NOTE — PLAN OF CARE
Problem: Patient Care Overview (Adult)  Goal: Plan of Care Review  Outcome: Ongoing (interventions implemented as appropriate)    03/12/17 0631   Coping/Psychosocial Response Interventions   Plan Of Care Reviewed With family   Patient Care Overview   Progress declining   Outcome Evaluation   Outcome Summary/Follow up Plan Pt responsive only to pain with some grimacing and moaning. Premedicated with Dilaudid prior to turns. Was winston Ativan and Robinul x1 each for symptom management. Conitnue comfort care.          Problem: Dying Patient, Actively (Adult)  Goal: Comfort/Pain Control  Outcome: Ongoing (interventions implemented as appropriate)  Goal: Dying Process, Peace and Dignity  Outcome: Ongoing (interventions implemented as appropriate)    Problem: Skin Integrity Impairment, Risk/Actual (Adult)  Goal: Skin Integrity/Wound Healing  Outcome: Ongoing (interventions implemented as appropriate)

## 2017-03-13 NOTE — PROGRESS NOTES
History:    This 85-year-old gentleman is resting comfortably.  Yesterday he had a high fever.  He is receiving comfort medications.  Family members are at his side.    Allergies  No known drug allergy      Current Facility-Administered Medications:   •  acetaminophen (TYLENOL) tablet 650 mg, 650 mg, Oral, Q4H PRN **OR** acetaminophen (TYLENOL) 160 MG/5ML solution 650 mg, 650 mg, Oral, Q4H PRN **OR** acetaminophen (TYLENOL) suppository 650 mg, 650 mg, Rectal, Q4H PRN, Lalo Zeng MD  •  acetaminophen (TYLENOL) tablet 650 mg, 650 mg, Oral, Q4H PRN **OR** acetaminophen (TYLENOL) 160 MG/5ML solution 650 mg, 650 mg, Oral, Q4H PRN **OR** acetaminophen (TYLENOL) suppository 650 mg, 650 mg, Rectal, Q4H PRN, Papo Becker MD  •  diphenoxylate-atropine (LOMOTIL) 2.5-0.025 MG per tablet 1 tablet, 1 tablet, Oral, Q2H PRN, Lalo Zeng MD  •  diphenoxylate-atropine (LOMOTIL) 2.5-0.025 MG per tablet 1 tablet, 1 tablet, Oral, Q2H PRN, Papo Becker MD  •  Glycerin-Hypromellose- (ARTIFICIAL TEARS) 0.2-0.2-1 % ophthalmic solution solution 1 drop, 1 drop, Both Eyes, Q30 Min PRN, Lalo Zeng MD  •  Glycerin-Hypromellose- (ARTIFICIAL TEARS) 0.2-0.2-1 % ophthalmic solution solution 1 drop, 1 drop, Both Eyes, Q30 Min PRN, Papo Becker MD  •  glycopyrrolate (ROBINUL) injection 0.2 mg, 0.2 mg, Intravenous, Q2H PRN, 0.2 mg at 03/12/17 2019 **OR** glycopyrrolate (ROBINUL) injection 0.2 mg, 0.2 mg, Subcutaneous, Q2H PRN **OR** glycopyrrolate (ROBINUL) injection 0.4 mg, 0.4 mg, Intravenous, Q2H PRN, 0.4 mg at 03/13/17 0505 **OR** glycopyrrolate (ROBINUL) injection 0.4 mg, 0.4 mg, Subcutaneous, Q2H PRN, Papo Becker MD  •  HYDROmorphone (DILAUDID) injection 1 mg, 1 mg, Intravenous, Q2H PRN, 1 mg at 03/12/17 1649 **OR** HYDROmorphone (DILAUDID) injection 1 mg, 1 mg, Subcutaneous, Q2H PRN, Papo Becker MD  •  HYDROmorphone (DILAUDID) injection 1.5 mg, 1.5 mg, Intravenous, Q2H PRN **OR** HYDROmorphone (DILAUDID)  injection 1.5 mg, 1.5 mg, Subcutaneous, Q2H PRN, Papo Becker MD  •  HYDROmorphone (DILAUDID) injection 2 mg, 2 mg, Intravenous, Q2H PRN, 2 mg at 03/13/17 0504 **OR** HYDROmorphone (DILAUDID) injection 2 mg, 2 mg, Subcutaneous, Q2H PRN, Papo Becker MD  •  LORazepam (ATIVAN) tablet 0.5 mg, 0.5 mg, Oral, Q1H PRN **OR** LORazepam (ATIVAN) injection 0.5 mg, 0.5 mg, Intravenous, Q1H PRN **OR** LORazepam (ATIVAN) injection 1 mg, 1 mg, Intramuscular, Q1H PRN **OR** LORazepam (ATIVAN) 2 MG/ML concentrated solution 0.5 mg, 0.5 mg, Oral, Q1H PRN **OR** LORazepam (ATIVAN) 2 MG/ML concentrated solution 0.5 mg, 0.5 mg, Sublingual, Q1H PRN, Lalo Zeng MD  •  LORazepam (ATIVAN) tablet 1 mg, 1 mg, Oral, Q1H PRN **OR** LORazepam (ATIVAN) injection 1 mg, 1 mg, Intravenous, Q1H PRN, 1 mg at 03/10/17 2219 **OR** LORazepam (ATIVAN) injection 1 mg, 1 mg, Intramuscular, Q1H PRN **OR** LORazepam (ATIVAN) 2 MG/ML concentrated solution 1 mg, 1 mg, Oral, Q1H PRN **OR** LORazepam (ATIVAN) 2 MG/ML concentrated solution 1 mg, 1 mg, Sublingual, Q1H PRN, Lalo Zeng MD  •  LORazepam (ATIVAN) tablet 1 mg, 1 mg, Oral, Q1H PRN **OR** LORazepam (ATIVAN) injection 1 mg, 1 mg, Intravenous, Q1H PRN **OR** LORazepam (ATIVAN) injection 1 mg, 1 mg, Intramuscular, Q1H PRN **OR** LORazepam (ATIVAN) 2 MG/ML concentrated solution 1 mg, 1 mg, Oral, Q1H PRN **OR** LORazepam (ATIVAN) 2 MG/ML concentrated solution 1 mg, 1 mg, Sublingual, Q1H PRN, Papo Becker MD  •  LORazepam (ATIVAN) tablet 2 mg, 2 mg, Oral, Q1H PRN **OR** LORazepam (ATIVAN) injection 2 mg, 2 mg, Intravenous, Q1H PRN, 2 mg at 03/12/17 0122 **OR** LORazepam (ATIVAN) injection 1 mg, 1 mg, Intramuscular, Q1H PRN **OR** LORazepam (ATIVAN) 2 MG/ML concentrated solution 2 mg, 2 mg, Oral, Q1H PRN **OR** LORazepam (ATIVAN) 2 MG/ML concentrated solution 2 mg, 2 mg, Sublingual, Q1H PRN, Lalo Zeng MD  •  Scopolamine (TRANSDERM-SCOP) 1.5 MG/3DAYS patch 1 patch, 1 patch, Transdermal, Q72H  "PRN, Papo Becker MD, 1 patch at 03/11/17 1945    Visit Vitals   • /64 (BP Location: Right arm, Patient Position: Lying)   • Pulse 102   • Temp (!) 102.7 °F (39.3 °C) (Oral)   • Resp 24   • Ht 70\" (177.8 cm)   • Wt 194 lb 7 oz (88.2 kg)   • SpO2 91%   • BMI 27.9 kg/m2       Physical Exam     Appearance: Elderly comatose gentleman.  He is breathing rapidly.  He is lying comfortably in bed.    Lungs: Shallow breaths.  Little air movement.    Heart: Tachycardic regular.    Abdomen: Quiet.    Neuro: Comatose    Lab Results (last 24 hours)     ** No results found for the last 24 hours. **          Imp:    Ischemic bowel with likely sepsis.  Patient in palliative care.    Plan:    Continue comfort measures.    Immediate prognosis is grim.      Papo Becker MD  3/13/2017  7:46 AM  "

## 2017-03-13 NOTE — PLAN OF CARE
Problem: Patient Care Overview (Adult)  Goal: Plan of Care Review  Outcome: Ongoing (interventions implemented as appropriate)  Continue symptom management and comfort care    03/12/17 0631 03/12/17 2019 03/13/17 0621   Coping/Psychosocial Response Interventions   Plan Of Care Reviewed With --  family --    Patient Care Overview   Progress declining --  --    Outcome Evaluation   Outcome Summary/Follow up Plan --  --  Medicate prior to turns Q4 hours. Pt is unresponsive. Family at bedside.

## 2017-03-13 NOTE — PLAN OF CARE
Problem: Patient Care Overview (Adult)  Goal: Adult Individualization and Mutuality  Outcome: Ongoing (interventions implemented as appropriate)  Goal: Discharge Needs Assessment  Outcome: Ongoing (interventions implemented as appropriate)    Problem: Dying Patient, Actively (Adult)  Goal: Comfort/Pain Control  Outcome: Ongoing (interventions implemented as appropriate)  Goal: Dying Process, Peace and Dignity  Outcome: Ongoing (interventions implemented as appropriate)    Problem: Skin Integrity Impairment, Risk/Actual (Adult)  Goal: Skin Integrity/Wound Healing  Outcome: Ongoing (interventions implemented as appropriate)

## 2017-03-14 NOTE — PROGRESS NOTES
Discharge Planning Assessment  [facility]     Patient Name: Jomar Cabral Jr.  MRN: [id number]  Today's Date: 3/14/2017    Admit Date: 3/10/2017          Discharge Needs Assessment     None            Discharge Plan       17 1246    Final Note    Final Note The pateint  on 3/13/17 @ 13:00. SHAWNA Amanda RN, CCP        Discharge Placement     No information found        Expected Discharge Date and Time     Expected Discharge Date Expected Discharge Time    Mar 13, 2017  5:51 PM              Demographic Summary     None            Functional Status     None            Psychosocial     None            Abuse/Neglect     None            Legal     None            Substance Abuse     None            Patient Forms     None          Mariel Amanda, RN

## 2017-03-20 NOTE — DISCHARGE SUMMARY
DATE OF ADMISSION:  03/10/2017  DATE OF DEATH:  03/13/2017     FINAL DIAGNOSES:   1.  Ischemic bowel with gangrene, followed by sepsis.   2.  Hypertensive urgency.   3.  Alzheimer's dementia.   4.  Chronic renal insufficiency.   5.  Obstructive sleep apnea.   6.  History of atrial fibrillation and sick sinus syndrome; patient had a pacemaker before his death.   7.  Hyperlipidemia.   8.  Osteoporosis.   9.  Benign prostatic hypertrophy with bladder outlet obstruction.   10.  Hypothyroid, on replacement.  11.  Past history of melanoma.    PROCEDURES PERFORMED:  Laparoscopy and exploratory laparotomy on 03/10/2017. Findings were gangrene of the small bowel with ischemic changes from the ligament of Treitz to the ileocecal valve. The procedure was performed by Dr. Lalo Zeng.    HISTORY OF PRESENT ILLNESS:  Patient was a nice, elderly, 84-year-old gentleman who came in the emergency room with extreme lower abdominal discomfort. The discomfort started abruptly a few hours prior to coming in to the emergency room. In the emergency room, all original lab was unremarkable. This includes CBC and lactic acid level. A CT scan was done of the abdomen and pelvis which showed possible small amount of diverticulitis in the sigmoid colon. The patient was observed. He had an episode of hypertension; this was treated with labetalol and Vasotec. As we watched him throughout the next few hours, his abdominal pain progressed and he started showing signs of acute abdomen. Repeat lactic acid was found to be markedly elevated. Surgery was involved in the case. The patient was taken for laparoscopy. He was converted to exploratory laparotomy. He was found to have ischemic bowel with gangrene. There was discussion with the family; it was felt the patient was not salvageable. He was placed on palliative care. For the next few days the patient stayed on palliative care, where he was given medications for comfort. On 03/13/2016, about 2  in the afternoon, the patient passed away without incident. I do not believe an autopsy was granted.        Papo Becker M.D.  BMO:ms  D:   03/20/2017 12:54:30  T:   03/20/2017 18:52:27  Job ID:   [id number]  Document ID:   [id number]  cc:

## 2020-02-06 NOTE — LETTER
January 9, 2017     Papo Becker MD  [address]  [place] [id number]    Patient: Jomar Cabral Jr.   YOB: 1932   Date of Visit: 1/6/2017       Dear Dr. Eugenio MD:    Thank you for referring Jomar Cabral to me for evaluation. Below is a copy of my consult note.    If you have questions, please do not hesitate to call me. I look forward to following Jomar along with you.         Sincerely,        Alessio Wolf MD        CC: No Recipients    Jomar Cabral Jr. is a 84 y.o. male who is seen as a consult at the request of Papo Becker MD for Rectal Bleeding.      HPI:  Denies any rb since hospitalization.  After 2-3 glasses of hot water then will stimulate BM. Does not think had cy at admission .  He states it has been years.  Not on blood thinner.  A [employer] service drove him today.  Wife at home.    Pt denies  trouble with hemorrhoids  Denies abs pain or cramping  occas flatus but not painful  Daily BM occas strain.  Pain meds daily.    Fiber - denies  Ss - denies  Denies tissue from anus  Denies anal pain or discomfort sitting or with BM    Past Medical History   Diagnosis Date   • Acquired hypothyroidism 3/8/2016   • Acquired pes planus    • Actinic keratosis    • Alzheimer disease    • Alzheimer's dementia 3/8/2016   • Anemia      D/T DIVERTICULAR BLEED   • Anxiety    • Atrial fibrillation 3/8/2016   • BPH (benign prostatic hyperplasia)    • Cancer      MELANOMA   • Colon polyps    • Depression    • Disease of thyroid gland    • Diverticulosis    • Essential hypertension 3/8/2016   • HTN (hypertension)    • Hyperlipidemia    • Hypertension    • Hypothyroid    • 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 Patient can be seen tomorrow at 2 pm.          Past Surgical History   Procedure Laterality Date   • Foot surgery     • Mohs surgery     • Cardiac electrophysiology procedure N/A 3/11/2016     Procedure: Pacemaker SC new;  Surgeon: Shantanu Rodriguez MD;  Location: [facility] CATH INVASIVE LOCATION;  Service:    • Colonoscopy N/A 12/10/2016     Procedure: COLONOSCOPY INTO CECUM WITH COLD BX POLYPECTOMIES;  Surgeon: Ricci Hensley MD;  Location: [facility] ENDOSCOPY;  Service:    • Fracture surgery Left 2001     ANKLE   • Skin graft Left 2003     EAR   • Eye surgery Bilateral 2011     D/T PTOSIS LIDS   • Joint replacement Right 2006     TOTAL KNEE REPLACEMENT   • Joint replacement Right 2008     KNEE REVISION   • Joint replacement Left 10/2016     SHOULDER-TOTAL       Social History:   reports that he has never smoked. He has never used smokeless tobacco. He reports that he drinks alcohol. He reports that he does not use illicit drugs.      Marriage status:     Family History   Problem Relation Age of Onset   • Parkinsonism Mother    • Heart failure Mother    • Cancer Father    • Heart failure Father    • Heart disease Father    • Heart failure Sister    • Multiple sclerosis Daughter    • Multiple sclerosis Son    • No Known Problems Sister          Current Outpatient Prescriptions:   •  aspirin 81 MG EC tablet, Take 81 mg by mouth daily., Disp: , Rfl:   •  Calcium Citrate-Vitamin D 250-200 MG-UNIT tablet, Take  by mouth., Disp: , Rfl:   •  cefdinir (OMNICEF) 300 MG capsule, TAKE ONE CAPSULE BY MOUTH TWICE A DAY FOR 7 DAYS, Disp: , Rfl: 0  •  citalopram (CeleXA) 20 MG tablet, Take  by mouth daily., Disp: , Rfl:   •  denosumab (PROLIA) 60 MG/ML solution syringe, Inject  under the skin every 6 (six) months., Disp: , Rfl:   •  ferrous sulfate 325 (65 FE) MG tablet, Take 1 tablet by mouth 3 (Three) Times a Day With Meals., Disp: 180 tablet, Rfl: 2  •  furosemide (LASIX) 20 MG tablet, Take 1 tablet by mouth Daily., Disp: 90 tablet, Rfl: 3  •  levothyroxine  (SYNTHROID, LEVOTHROID) 125 MCG tablet, Take 137 mcg by mouth daily., Disp: , Rfl:   •  levothyroxine (SYNTHROID, LEVOTHROID) 137 MCG tablet, Take 137 mcg by mouth daily., Disp: , Rfl:   •  Memantine HCl-Donepezil HCl (NAMZARIC PO), Take 10-28 mg by mouth every evening., Disp: , Rfl:   •  modafinil (PROVIGIL) 200 MG tablet, Take  by mouth daily., Disp: , Rfl:   •  Multiple Vitamin (MULTI VITAMIN DAILY PO), Take  by mouth daily., Disp: , Rfl:   •  Multiple Vitamins-Minerals (CENTRUM SILVER PO), Take  by mouth Daily., Disp: , Rfl:   •  NAMZARIC 28-10 MG capsule sustained-release 24 hr, TAKE 1 CAPSULE BY MOUTH DAILY, Disp: , Rfl: 3  •  POTASSIUM GLUCONATE PO, Take 585 mg by mouth daily., Disp: , Rfl:   •  pramipexole (MIRAPEX) 0.125 MG tablet, Take 0.125 mg by mouth 1 (one) time., Disp: , Rfl:   •  QUEtiapine (SEROquel) 50 MG tablet, Take 50 mg by mouth Every Night., Disp: , Rfl:   •  simvastatin (ZOCOR) 40 MG tablet, , Disp: , Rfl:   •  tamsulosin (FLOMAX) 0.4 MG capsule 24 hr capsule, Take  by mouth., Disp: , Rfl:   •  tamsulosin (FLOMAX) 0.4 MG capsule 24 hr capsule, Take 1 capsule by mouth Daily., Disp: 90 capsule, Rfl: 3  •  traMADol (ULTRAM) 50 MG tablet, 50 mg 2 (Two) Times a Day., Disp: , Rfl: 3  •  valsartan (DIOVAN) 320 MG tablet, Take 1 tablet by mouth Daily., Disp: 90 tablet, Rfl: 3    Allergy  No known drug allergy    Review of Systems   HENT: Positive for hearing loss.    Respiratory: Positive for shortness of breath.    Gastrointestinal: Positive for bowel incontinence.   Neurological: Positive for excessive daytime sleepiness.   All other systems reviewed and are negative.      Vitals:    01/06/17 1015   BP: 122/78   Pulse: 61   Resp: 20   Temp: 97.8 °F (36.6 °C)   SpO2: 97%     Body mass index is 28.5 kg/(m^2).    Physical Exam   Constitutional: He is oriented to person, place, and time. He appears well-developed and well-nourished. No distress.   HENT:   Head: Normocephalic and atraumatic.   Nose:  Nose normal.   Mouth/Throat: Oropharynx is clear and moist.   Eyes: Conjunctivae and EOM are normal. Pupils are equal, round, and reactive to light.   Neck: Normal range of motion. No tracheal deviation present.   Pulmonary/Chest: Effort normal and breath sounds normal. No respiratory distress.   Abdominal: Soft. Bowel sounds are normal. He exhibits no distension.   Genitourinary:   Genitourinary Comments: Perianal exam: external hem - wnl  LOREN- good tone, no masses  Anoscopy performed:  Grade 1 x 3 internal hem     Musculoskeletal: Normal range of motion. He exhibits no edema or deformity.   Neurological: He is alert and oriented to person, place, and time. No cranial nerve deficit. Coordination and gait normal.   Skin: Skin is warm and dry.   Psychiatric: He has a normal mood and affect. His behavior is normal. Judgment normal.       Review of Medical Record:I reviewed notes from hospitalization from 12/16 which includes consults from GS and GI, a colonoscopy which was negative, and d/c summary.  Assessment:  1. Internal hemorrhoids without complication    2. External hemorrhoids without complication    3. Alzheimer's disease of other onset without behavioral disturbance        Plan:  For the hemorrhoids, they're nonsurgical at this point. I would encourage pt to take fiber and stool softeners as needed.  RTC prn.    EMR Dragon/Transcription disclaimer:   Much of this encounter note is an electronic transcription/translation of spoken language to printed text. The electronic translation of spoken language may permit erroneous, or at times, nonsensical words or phrases to be inadvertently transcribed; Although I have reviewed the note for such errors, some may still exist.

## 2022-07-27 NOTE — PLAN OF CARE
BIPAP setting at I/E 16/8 rate 10 FIO2@ 100%   Problem: Patient Care Overview (Adult)  Goal: Plan of Care Review  Outcome: Ongoing (interventions implemented as appropriate)    03/11/17 0850   Coping/Psychosocial Response Interventions   Plan Of Care Reviewed With patient;spouse;daughter;family   Patient Care Overview   Progress declining   Outcome Evaluation   Outcome Summary/Follow up Plan Pt brought over after exploratory lap showed ischemic bowel. Pt made palliative. Pt given Dilaudid and Ativan for pain and restlessness and seems to be much more comfrtable this AM. Family at bedside, education done. will continue comfort care.         Problem: Dying Patient, Actively (Adult)  Goal: Identify Related Risk Factors and Signs and Symptoms  Outcome: Outcome(s) achieved Date Met:  03/11/17  Goal: Comfort/Pain Control  Outcome: Ongoing (interventions implemented as appropriate)  Goal: Dying Process, Peace and Dignity  Outcome: Ongoing (interventions implemented as appropriate)    Problem: Skin Integrity Impairment, Risk/Actual (Adult)  Goal: Identify Related Risk Factors and Signs and Symptoms  Outcome: Outcome(s) achieved Date Met:  03/11/17  Goal: Skin Integrity/Wound Healing  Outcome: Ongoing (interventions implemented as appropriate)

## (undated) DEVICE — LOU LAP CHOLE: Brand: MEDLINE INDUSTRIES, INC.

## (undated) DEVICE — SUT SILK 3/0 TIES 18IN A184H

## (undated) DEVICE — SKIN PREP TRAY W/CHG: Brand: MEDLINE INDUSTRIES, INC.

## (undated) DEVICE — LAPAROSCOPIC SMOKE ELIMINATION DEVICE: Brand: PNEUVIEW XE

## (undated) DEVICE — SUT SILK 0 TIES 18IN SA66G

## (undated) DEVICE — ELECTRD BLD EXT EDGE/INSUL 6IN

## (undated) DEVICE — SUT VIC 0 TN 27IN DYED JTN0G

## (undated) DEVICE — GOWN ,SIRUS,NONREINFORCED SMALL: Brand: MEDLINE

## (undated) DEVICE — 1842 FOAM BLOCK NEEDLE COUNTER: Brand: DEVON

## (undated) DEVICE — ENCORE® LATEX ORTHO SIZE 7.5, STERILE LATEX POWDER-FREE SURGICAL GLOVE: Brand: ENCORE

## (undated) DEVICE — ENDOPATH XCEL UNIVERSAL TROCAR STABLILITY SLEEVES: Brand: ENDOPATH XCEL

## (undated) DEVICE — SUT PDS 0 CT1 36IN Z346H

## (undated) DEVICE — GAUZE,SPONGE,FLUFF,6"X6.75",STRL,10/TRAY: Brand: MEDLINE

## (undated) DEVICE — DRSNG SURESITE WNDW 2.38X2.75

## (undated) DEVICE — TOTAL TRAY, 16FR 10ML SIL FOLEY, URN: Brand: MEDLINE

## (undated) DEVICE — SOL NACL 0.9PCT 1000ML

## (undated) DEVICE — COVER,MAYO STAND,STERILE: Brand: MEDLINE

## (undated) DEVICE — GLV SURG BIOGEL LTX PF 6

## (undated) DEVICE — SPNG GZ WOVN 4X4IN 12PLY 10/BX STRL

## (undated) DEVICE — DISPOSABLE GRASPER CARTRIDGE: Brand: DIRECT DRIVE REPOSABLE GRASPERS

## (undated) DEVICE — DRSNG SURESITE123 4X10IN

## (undated) DEVICE — SUT SILK 2/0 SH CR8 18IN CR8 C012D

## (undated) DEVICE — ENDOPATH XCEL BLADELESS TROCARS WITH STABILITY SLEEVES: Brand: ENDOPATH XCEL

## (undated) DEVICE — PK PROC MAJ 40

## (undated) DEVICE — ENDOCUT SCISSOR TIP, DISPOSABLE: Brand: RENEW

## (undated) DEVICE — MEDI-VAC YANKAUER SUCTION HANDLE W/BULBOUS TIP: Brand: CARDINAL HEALTH

## (undated) DEVICE — ENDOPATH PNEUMONEEDLE INSUFFLATION NEEDLES WITH LUER LOCK CONNECTORS 120MM: Brand: ENDOPATH

## (undated) DEVICE — SUT SILK 2/0 TIES 18IN A185H

## (undated) DEVICE — LAPAROSCOPIC GAS CONDITIONING DEVICE.: Brand: INSUFLOW

## (undated) DEVICE — VISUALIZATION SYSTEM: Brand: CLEARIFY

## (undated) DEVICE — SUT VIC 5/0 PS2 18IN J495H

## (undated) DEVICE — IRRIGATOR BULB ASEPTO 60CC STRL